# Patient Record
Sex: FEMALE | Race: WHITE | NOT HISPANIC OR LATINO
[De-identification: names, ages, dates, MRNs, and addresses within clinical notes are randomized per-mention and may not be internally consistent; named-entity substitution may affect disease eponyms.]

---

## 2017-04-14 ENCOUNTER — APPOINTMENT (OUTPATIENT)
Dept: OTOLARYNGOLOGY | Facility: CLINIC | Age: 57
End: 2017-04-14

## 2017-04-14 VITALS
BODY MASS INDEX: 28.7 KG/M2 | SYSTOLIC BLOOD PRESSURE: 121 MMHG | DIASTOLIC BLOOD PRESSURE: 74 MMHG | WEIGHT: 162 LBS | HEART RATE: 82 BPM | HEIGHT: 63 IN

## 2017-04-14 DIAGNOSIS — L29.9 PRURITUS, UNSPECIFIED: ICD-10-CM

## 2017-04-14 DIAGNOSIS — M26.609 UNSPECIFIED TEMPOROMANDIBULAR JOINT DISORDER: ICD-10-CM

## 2017-04-14 DIAGNOSIS — H69.82 OTHER SPECIFIED DISORDERS OF EUSTACHIAN TUBE, LEFT EAR: ICD-10-CM

## 2017-04-14 DIAGNOSIS — Z83.3 FAMILY HISTORY OF DIABETES MELLITUS: ICD-10-CM

## 2018-06-28 ENCOUNTER — APPOINTMENT (OUTPATIENT)
Dept: OTOLARYNGOLOGY | Facility: CLINIC | Age: 58
End: 2018-06-28
Payer: COMMERCIAL

## 2018-06-28 VITALS — SYSTOLIC BLOOD PRESSURE: 137 MMHG | HEART RATE: 82 BPM | OXYGEN SATURATION: 100 % | DIASTOLIC BLOOD PRESSURE: 83 MMHG

## 2018-06-28 VITALS — BODY MASS INDEX: 30.83 KG/M2 | WEIGHT: 174 LBS | HEIGHT: 63 IN

## 2018-06-28 PROCEDURE — 99203 OFFICE O/P NEW LOW 30 MIN: CPT

## 2018-06-28 PROCEDURE — 92550 TYMPANOMETRY & REFLEX THRESH: CPT

## 2018-06-28 PROCEDURE — 92557 COMPREHENSIVE HEARING TEST: CPT

## 2018-07-05 ENCOUNTER — APPOINTMENT (OUTPATIENT)
Dept: NEUROLOGY | Facility: CLINIC | Age: 58
End: 2018-07-05
Payer: COMMERCIAL

## 2018-07-05 VITALS
HEIGHT: 63 IN | BODY MASS INDEX: 30.83 KG/M2 | WEIGHT: 174 LBS | HEART RATE: 89 BPM | DIASTOLIC BLOOD PRESSURE: 85 MMHG | SYSTOLIC BLOOD PRESSURE: 139 MMHG

## 2018-07-05 DIAGNOSIS — R42 DIZZINESS AND GIDDINESS: ICD-10-CM

## 2018-07-05 DIAGNOSIS — D48.9 NEOPLASM OF UNCERTAIN BEHAVIOR, UNSPECIFIED: ICD-10-CM

## 2018-07-05 DIAGNOSIS — E66.9 OBESITY, UNSPECIFIED: ICD-10-CM

## 2018-07-05 PROCEDURE — 99204 OFFICE O/P NEW MOD 45 MIN: CPT

## 2018-07-05 RX ORDER — AMOXICILLIN 500 MG/1
500 CAPSULE ORAL
Qty: 20 | Refills: 0 | Status: DISCONTINUED | COMMUNITY
Start: 2017-04-07 | End: 2018-07-05

## 2018-07-05 RX ORDER — PREDNISOLONE ACETATE 10 MG/ML
1 SUSPENSION/ DROPS OPHTHALMIC
Qty: 1 | Refills: 3 | Status: DISCONTINUED | COMMUNITY
Start: 2017-04-14 | End: 2018-07-05

## 2018-07-05 RX ORDER — FLUTICASONE PROPIONATE 50 UG/1
50 SPRAY, METERED NASAL DAILY
Qty: 1 | Refills: 1 | Status: DISCONTINUED | COMMUNITY
Start: 2017-04-14 | End: 2018-07-05

## 2018-07-05 RX ORDER — CEFDINIR 300 MG/1
300 CAPSULE ORAL
Qty: 20 | Refills: 0 | Status: DISCONTINUED | COMMUNITY
Start: 2016-11-05 | End: 2018-07-05

## 2018-07-05 RX ORDER — CIPROFLOXACIN 3 MG/ML
0.3 SOLUTION OPHTHALMIC
Qty: 5 | Refills: 0 | Status: DISCONTINUED | COMMUNITY
Start: 2017-03-27 | End: 2018-07-05

## 2018-07-05 RX ORDER — AZITHROMYCIN 250 MG/1
250 TABLET, FILM COATED ORAL
Qty: 6 | Refills: 0 | Status: DISCONTINUED | COMMUNITY
Start: 2016-11-01 | End: 2018-07-05

## 2018-07-05 RX ORDER — AMOXICILLIN AND CLAVULANATE POTASSIUM 875; 125 MG/1; MG/1
875-125 TABLET, COATED ORAL
Qty: 20 | Refills: 0 | Status: DISCONTINUED | COMMUNITY
Start: 2016-11-04 | End: 2018-07-05

## 2018-07-19 ENCOUNTER — APPOINTMENT (OUTPATIENT)
Dept: NEUROLOGY | Facility: CLINIC | Age: 58
End: 2018-07-19

## 2020-11-07 ENCOUNTER — TRANSCRIPTION ENCOUNTER (OUTPATIENT)
Age: 60
End: 2020-11-07

## 2020-12-09 ENCOUNTER — TRANSCRIPTION ENCOUNTER (OUTPATIENT)
Age: 60
End: 2020-12-09

## 2021-03-13 ENCOUNTER — TRANSCRIPTION ENCOUNTER (OUTPATIENT)
Age: 61
End: 2021-03-13

## 2021-04-12 ENCOUNTER — TRANSCRIPTION ENCOUNTER (OUTPATIENT)
Age: 61
End: 2021-04-12

## 2021-05-03 ENCOUNTER — TRANSCRIPTION ENCOUNTER (OUTPATIENT)
Age: 61
End: 2021-05-03

## 2021-05-16 ENCOUNTER — TRANSCRIPTION ENCOUNTER (OUTPATIENT)
Age: 61
End: 2021-05-16

## 2021-05-27 ENCOUNTER — TRANSCRIPTION ENCOUNTER (OUTPATIENT)
Age: 61
End: 2021-05-27

## 2021-05-31 ENCOUNTER — TRANSCRIPTION ENCOUNTER (OUTPATIENT)
Age: 61
End: 2021-05-31

## 2021-08-22 ENCOUNTER — TRANSCRIPTION ENCOUNTER (OUTPATIENT)
Age: 61
End: 2021-08-22

## 2021-08-29 ENCOUNTER — TRANSCRIPTION ENCOUNTER (OUTPATIENT)
Age: 61
End: 2021-08-29

## 2021-09-09 ENCOUNTER — TRANSCRIPTION ENCOUNTER (OUTPATIENT)
Age: 61
End: 2021-09-09

## 2022-01-20 ENCOUNTER — RESULT REVIEW (OUTPATIENT)
Age: 62
End: 2022-01-20

## 2022-03-24 ENCOUNTER — NON-APPOINTMENT (OUTPATIENT)
Age: 62
End: 2022-03-24

## 2022-03-25 ENCOUNTER — APPOINTMENT (OUTPATIENT)
Dept: BREAST CENTER | Facility: CLINIC | Age: 62
End: 2022-03-25
Payer: COMMERCIAL

## 2022-03-25 VITALS
DIASTOLIC BLOOD PRESSURE: 86 MMHG | HEIGHT: 63 IN | SYSTOLIC BLOOD PRESSURE: 133 MMHG | BODY MASS INDEX: 32.07 KG/M2 | HEART RATE: 93 BPM | WEIGHT: 181 LBS

## 2022-03-25 DIAGNOSIS — Z80.9 FAMILY HISTORY OF MALIGNANT NEOPLASM, UNSPECIFIED: ICD-10-CM

## 2022-03-25 DIAGNOSIS — Z92.3 PERSONAL HISTORY OF IRRADIATION: ICD-10-CM

## 2022-03-25 DIAGNOSIS — N64.89 OTHER SPECIFIED DISORDERS OF BREAST: ICD-10-CM

## 2022-03-25 DIAGNOSIS — N64.4 MASTODYNIA: ICD-10-CM

## 2022-03-25 PROCEDURE — 99244 OFF/OP CNSLTJ NEW/EST MOD 40: CPT

## 2022-03-25 NOTE — DATA REVIEWED
[FreeTextEntry1] : Bilateral mammogram 1/21/2022:  No mammographic or ultrasound evidence of malignancy.\par \par (Images not brought.)

## 2022-03-25 NOTE — HISTORY OF PRESENT ILLNESS
[FreeTextEntry1] : This is a 61 year old female who has a history of left breast cancer at age 50.  She was treated with a lumpectomy and sentinel node biopsy and required a re-excision for margins at [place].  She received radiation and took tamoxifen for 6 months.  She stopped due to side effects of leg pains and " not feeling well."\par \par She has always had discomfort in the left breast, but now also complains of intermittent right breast pain for a few months.  It comes and goes.  She drinks 1 cup of coffee/day.  She has gained about 15 pounds recently.\par \par She is also bothered by the left breast deformity.

## 2022-03-25 NOTE — PHYSICAL EXAM
[EOMI] : extra ocular movement intact [Sclera nonicteric] : sclera nonicteric [Supple] : supple [No Supraclavicular Adenopathy] : no supraclavicular adenopathy [No Cervical Adenopathy] : no cervical adenopathy [No Thyromegaly] : no thyromegaly [Clear to Auscultation Bilat] : clear to auscultation bilaterally [Normal Sinus Rhythm] : normal sinus rhythm [Normal S1, S2] : normal S1 and S2 [Examined in the supine and seated position] : examined in the supine and seated position [Asymmetrical] : asymmetrical [Bra Size: ___] : Bra Size: [unfilled] [No dominant masses] : no dominant masses in right breast  [No dominant masses] : no dominant masses left breast [No Nipple Retraction] : no left nipple retraction [No Nipple Discharge] : no left nipple discharge [No Axillary Lymphadenopathy] : no left axillary lymphadenopathy [Soft] : abdomen soft [Not Tender] : non-tender [No Palpable Masses] : no abdominal mass palpated [de-identified] : Left breast smaller than right [de-identified] : Radial scar 2-3:00 with depression of tissue/deformity. [de-identified] : small scar

## 2022-03-25 NOTE — PAST MEDICAL HISTORY
[Total Preg ___] : G[unfilled] [Live Births ___] : P[unfilled]  [Age At Live Birth ___] : Age at live birth: [unfilled] [FreeTextEntry2] : 2 daughters

## 2022-04-07 DIAGNOSIS — Z13.79 ENCOUNTER FOR OTHER SCREENING FOR GENETIC AND CHROMOSOMAL ANOMALIES: ICD-10-CM

## 2022-04-12 ENCOUNTER — NON-APPOINTMENT (OUTPATIENT)
Age: 62
End: 2022-04-12

## 2022-04-20 ENCOUNTER — APPOINTMENT (OUTPATIENT)
Dept: MRI IMAGING | Facility: CLINIC | Age: 62
End: 2022-04-20
Payer: COMMERCIAL

## 2022-04-20 ENCOUNTER — RESULT REVIEW (OUTPATIENT)
Age: 62
End: 2022-04-20

## 2022-04-20 ENCOUNTER — TRANSCRIPTION ENCOUNTER (OUTPATIENT)
Age: 62
End: 2022-04-20

## 2022-04-20 PROCEDURE — A9585: CPT | Mod: JW

## 2022-04-20 PROCEDURE — 77049 MRI BREAST C-+ W/CAD BI: CPT

## 2022-04-21 DIAGNOSIS — R92.8 OTHER ABNORMAL AND INCONCLUSIVE FINDINGS ON DIAGNOSTIC IMAGING OF BREAST: ICD-10-CM

## 2022-04-25 ENCOUNTER — RESULT REVIEW (OUTPATIENT)
Age: 62
End: 2022-04-25

## 2022-04-25 ENCOUNTER — APPOINTMENT (OUTPATIENT)
Dept: ULTRASOUND IMAGING | Facility: IMAGING CENTER | Age: 62
End: 2022-04-25
Payer: COMMERCIAL

## 2022-04-25 ENCOUNTER — OUTPATIENT (OUTPATIENT)
Dept: OUTPATIENT SERVICES | Facility: HOSPITAL | Age: 62
LOS: 1 days | End: 2022-04-25
Payer: COMMERCIAL

## 2022-04-25 DIAGNOSIS — R92.8 OTHER ABNORMAL AND INCONCLUSIVE FINDINGS ON DIAGNOSTIC IMAGING OF BREAST: ICD-10-CM

## 2022-04-25 DIAGNOSIS — Z98.89 OTHER SPECIFIED POSTPROCEDURAL STATES: Chronic | ICD-10-CM

## 2022-04-25 PROCEDURE — 77065 DX MAMMO INCL CAD UNI: CPT

## 2022-04-25 PROCEDURE — 88305 TISSUE EXAM BY PATHOLOGIST: CPT

## 2022-04-25 PROCEDURE — 88305 TISSUE EXAM BY PATHOLOGIST: CPT | Mod: 26

## 2022-04-25 PROCEDURE — 77065 DX MAMMO INCL CAD UNI: CPT | Mod: 26,LT

## 2022-04-25 PROCEDURE — 19084 BX BREAST ADD LESION US IMAG: CPT | Mod: LT

## 2022-04-25 PROCEDURE — 19083 BX BREAST 1ST LESION US IMAG: CPT

## 2022-04-25 PROCEDURE — 88360 TUMOR IMMUNOHISTOCHEM/MANUAL: CPT

## 2022-04-25 PROCEDURE — 88377 M/PHMTRC ALYS ISHQUANT/SEMIQ: CPT | Mod: 26

## 2022-04-25 PROCEDURE — A4648: CPT

## 2022-04-25 PROCEDURE — 19083 BX BREAST 1ST LESION US IMAG: CPT | Mod: LT

## 2022-04-25 PROCEDURE — 19084 BX BREAST ADD LESION US IMAG: CPT

## 2022-04-25 PROCEDURE — 88360 TUMOR IMMUNOHISTOCHEM/MANUAL: CPT | Mod: 26

## 2022-04-25 PROCEDURE — 88377 M/PHMTRC ALYS ISHQUANT/SEMIQ: CPT

## 2022-05-02 ENCOUNTER — APPOINTMENT (OUTPATIENT)
Dept: BREAST CENTER | Facility: CLINIC | Age: 62
End: 2022-05-02
Payer: COMMERCIAL

## 2022-05-02 VITALS
BODY MASS INDEX: 32.07 KG/M2 | SYSTOLIC BLOOD PRESSURE: 121 MMHG | WEIGHT: 181 LBS | HEIGHT: 63 IN | DIASTOLIC BLOOD PRESSURE: 84 MMHG | HEART RATE: 82 BPM

## 2022-05-02 DIAGNOSIS — Z85.3 PERSONAL HISTORY OF MALIGNANT NEOPLASM OF BREAST: ICD-10-CM

## 2022-05-02 PROCEDURE — 99215 OFFICE O/P EST HI 40 MIN: CPT

## 2022-05-02 RX ORDER — LEVOCETIRIZINE DIHYDROCHLORIDE 0.5 MG/ML
2.5 SOLUTION ORAL
Refills: 0 | Status: DISCONTINUED | COMMUNITY
End: 2022-05-02

## 2022-05-02 NOTE — DATA REVIEWED
[FreeTextEntry1] : Bilateral mammogram 1/21/2022:  No mammographic or ultrasound evidence of malignancy.\par \par Bilateral breast MRI 4/20/2022 Postlumpectomy changes left anterior outer breast.  Two adjacent enhancing masses 5 and 6 mm, 6 mm apart, rec Ultrasound and core biopsies.\par \par Pathology 4/25/2022:  Left breast 3N3= invasive well diff ductal carcinoma, SBR 5/9 4 mm (heart clip)\par                                     Left 3 N4= invasive well diff ductal carcinoma, SBR 5/9, DCIS, ER >95% WA 0, Her 2 2+ CISH nonamplified. (wing clip)

## 2022-05-02 NOTE — PHYSICAL EXAM
[EOMI] : extra ocular movement intact [Sclera nonicteric] : sclera nonicteric [Supple] : supple [No Supraclavicular Adenopathy] : no supraclavicular adenopathy [No Cervical Adenopathy] : no cervical adenopathy [No Thyromegaly] : no thyromegaly [Clear to Auscultation Bilat] : clear to auscultation bilaterally [Normal Sinus Rhythm] : normal sinus rhythm [Normal S1, S2] : normal S1 and S2 [Examined in the supine and seated position] : examined in the supine and seated position [Asymmetrical] : asymmetrical [Bra Size: ___] : Bra Size: [unfilled] [No dominant masses] : no dominant masses in right breast  [No dominant masses] : no dominant masses left breast [No Nipple Retraction] : no left nipple retraction [No Nipple Discharge] : no left nipple discharge [No Axillary Lymphadenopathy] : no left axillary lymphadenopathy [Soft] : abdomen soft [Not Tender] : non-tender [No Palpable Masses] : no abdominal mass palpated [de-identified] : Left breast smaller than right [de-identified] : Radial scar 2-3:00 with depression of tissue/deformity. [de-identified] : small scar

## 2022-05-02 NOTE — HISTORY OF PRESENT ILLNESS
[FreeTextEntry1] : This is a 61 year old female who has a history of left breast cancer at age 50.  She was treated with a lumpectomy and sentinel node biopsy and required a re-excision for margins at [place].  She had a 1.4 cm tubular cancer with extensive DCIS, 6 negative nodes. She received radiation and took tamoxifen for 6 months.  She stopped due to side effects of leg pains and " not feeling well."\par \par She was seen for an initial visit on March 25. She had always had discomfort in the left breast, but also complained of intermittent right breast pain for a few months.  She was also bothered by the left breast deformity.\par \par I sent her for a breast MRI and there were two small abnormalities in the left breast.  Ultrasound guided core biopsies of both sites showed invasive ductal cancer.

## 2022-05-03 ENCOUNTER — NON-APPOINTMENT (OUTPATIENT)
Age: 62
End: 2022-05-03

## 2022-05-06 ENCOUNTER — APPOINTMENT (OUTPATIENT)
Dept: NUCLEAR MEDICINE | Facility: IMAGING CENTER | Age: 62
End: 2022-05-06
Payer: COMMERCIAL

## 2022-05-06 ENCOUNTER — OUTPATIENT (OUTPATIENT)
Dept: OUTPATIENT SERVICES | Facility: HOSPITAL | Age: 62
LOS: 1 days | End: 2022-05-06
Payer: COMMERCIAL

## 2022-05-06 DIAGNOSIS — Z98.89 OTHER SPECIFIED POSTPROCEDURAL STATES: Chronic | ICD-10-CM

## 2022-05-06 DIAGNOSIS — Z00.8 ENCOUNTER FOR OTHER GENERAL EXAMINATION: ICD-10-CM

## 2022-05-06 DIAGNOSIS — C50.912 MALIGNANT NEOPLASM OF UNSPECIFIED SITE OF LEFT FEMALE BREAST: ICD-10-CM

## 2022-05-06 PROCEDURE — 78815 PET IMAGE W/CT SKULL-THIGH: CPT

## 2022-05-06 PROCEDURE — A9552: CPT

## 2022-05-06 PROCEDURE — 78815 PET IMAGE W/CT SKULL-THIGH: CPT | Mod: 26,PI

## 2022-05-09 ENCOUNTER — APPOINTMENT (OUTPATIENT)
Age: 62
End: 2022-05-09
Payer: COMMERCIAL

## 2022-05-09 VITALS
HEIGHT: 63 IN | HEART RATE: 81 BPM | DIASTOLIC BLOOD PRESSURE: 78 MMHG | OXYGEN SATURATION: 98 % | BODY MASS INDEX: 30.03 KG/M2 | SYSTOLIC BLOOD PRESSURE: 122 MMHG | TEMPERATURE: 97.5 F | WEIGHT: 169.5 LBS

## 2022-05-09 DIAGNOSIS — Z42.1 ENCOUNTER FOR BREAST RECONSTRUCTION FOLLOWING MASTECTOMY: ICD-10-CM

## 2022-05-09 PROCEDURE — 99204 OFFICE O/P NEW MOD 45 MIN: CPT

## 2022-05-10 PROBLEM — Z42.1 ENCOUNTER FOR BREAST RECONSTRUCTION FOLLOWING MASTECTOMY: Status: ACTIVE | Noted: 2022-05-10

## 2022-05-11 ENCOUNTER — NON-APPOINTMENT (OUTPATIENT)
Age: 62
End: 2022-05-11

## 2022-05-11 NOTE — PHYSICAL EXAM
[NI] : Normal [de-identified] : NAD, AxOx3  [de-identified] : nonlabored breathing  [de-identified] : normal HR [de-identified] : Bilateral Breasts- no masses, no nipple discharge or retraction. There is asymmetry with right >left. Left lumpectomy scar well healed. Mild radiation changes to left breast. There is grade 3 ptosis.\par \par RIGHT:\par Sn-N 29\par N-IMF 11\par BW 14\par \par LEFT;\par SN-N 27\par N-IMF 9\par BW 13  [de-identified] : soft, no appreciable hernias, mild pannus  [de-identified] : no edema  [de-identified] : normal affect

## 2022-05-11 NOTE — HISTORY OF PRESENT ILLNESS
[FreeTextEntry1] : Florencia Berg is a 60 y/o female with history of left breast cancer at age 50.  She had a lumpectomy at the time and received radiation and tamoxifen, which patient stopped due to side effects. \par She now presents with new invasive ductal cancer on the left. She presents today with her  to discuss post mastectomy reconstruction options. \par She otherwise has no complaints today. 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

## 2022-05-20 ENCOUNTER — APPOINTMENT (OUTPATIENT)
Dept: MRI IMAGING | Facility: CLINIC | Age: 62
End: 2022-05-20
Payer: COMMERCIAL

## 2022-05-20 ENCOUNTER — OUTPATIENT (OUTPATIENT)
Dept: OUTPATIENT SERVICES | Facility: HOSPITAL | Age: 62
LOS: 1 days | End: 2022-05-20
Payer: COMMERCIAL

## 2022-05-20 DIAGNOSIS — C50.412 MALIGNANT NEOPLASM OF UPPER-OUTER QUADRANT OF LEFT FEMALE BREAST: ICD-10-CM

## 2022-05-20 DIAGNOSIS — Z42.1 ENCOUNTER FOR BREAST RECONSTRUCTION FOLLOWING MASTECTOMY: ICD-10-CM

## 2022-05-20 DIAGNOSIS — Z98.89 OTHER SPECIFIED POSTPROCEDURAL STATES: Chronic | ICD-10-CM

## 2022-05-20 PROCEDURE — A9585: CPT

## 2022-05-20 PROCEDURE — C8902: CPT

## 2022-05-20 PROCEDURE — 72198 MR ANGIO PELVIS W/O & W/DYE: CPT | Mod: 26

## 2022-05-20 PROCEDURE — 74185 MRA ABD W OR W/O CNTRST: CPT | Mod: 26

## 2022-05-20 PROCEDURE — C8920: CPT

## 2022-05-24 ENCOUNTER — NON-APPOINTMENT (OUTPATIENT)
Age: 62
End: 2022-05-24

## 2022-05-25 ENCOUNTER — RESULT REVIEW (OUTPATIENT)
Age: 62
End: 2022-05-25

## 2022-05-25 ENCOUNTER — OUTPATIENT (OUTPATIENT)
Dept: OUTPATIENT SERVICES | Facility: HOSPITAL | Age: 62
LOS: 1 days | End: 2022-05-25
Payer: COMMERCIAL

## 2022-05-25 DIAGNOSIS — C50.412 MALIGNANT NEOPLASM OF UPPER-OUTER QUADRANT OF LEFT FEMALE BREAST: ICD-10-CM

## 2022-05-25 DIAGNOSIS — Z98.89 OTHER SPECIFIED POSTPROCEDURAL STATES: Chronic | ICD-10-CM

## 2022-05-25 PROCEDURE — 88321 CONSLTJ&REPRT SLD PREP ELSWR: CPT

## 2022-05-26 DIAGNOSIS — C50.412 MALIGNANT NEOPLASM OF UPPER-OUTER QUADRANT OF LEFT FEMALE BREAST: ICD-10-CM

## 2022-06-14 ENCOUNTER — RESULT REVIEW (OUTPATIENT)
Age: 62
End: 2022-06-14

## 2022-06-14 ENCOUNTER — OUTPATIENT (OUTPATIENT)
Dept: OUTPATIENT SERVICES | Facility: HOSPITAL | Age: 62
LOS: 1 days | End: 2022-06-14
Payer: COMMERCIAL

## 2022-06-14 DIAGNOSIS — Z01.818 ENCOUNTER FOR OTHER PREPROCEDURAL EXAMINATION: ICD-10-CM

## 2022-06-14 DIAGNOSIS — Z98.51 TUBAL LIGATION STATUS: Chronic | ICD-10-CM

## 2022-06-14 DIAGNOSIS — Z85.3 PERSONAL HISTORY OF MALIGNANT NEOPLASM OF BREAST: ICD-10-CM

## 2022-06-14 DIAGNOSIS — Z98.89 OTHER SPECIFIED POSTPROCEDURAL STATES: Chronic | ICD-10-CM

## 2022-06-14 LAB
ALBUMIN SERPL ELPH-MCNC: 4.4 G/DL — SIGNIFICANT CHANGE UP (ref 3.3–5)
ALP SERPL-CCNC: 119 U/L — SIGNIFICANT CHANGE UP (ref 40–120)
ALT FLD-CCNC: 23 U/L — SIGNIFICANT CHANGE UP (ref 12–78)
ANION GAP SERPL CALC-SCNC: 9 MMOL/L — SIGNIFICANT CHANGE UP (ref 5–17)
APPEARANCE UR: CLEAR — SIGNIFICANT CHANGE UP
APTT BLD: 34.3 SEC — SIGNIFICANT CHANGE UP (ref 27.5–35.5)
AST SERPL-CCNC: 9 U/L — LOW (ref 15–37)
BASOPHILS # BLD AUTO: 0.05 K/UL — SIGNIFICANT CHANGE UP (ref 0–0.2)
BASOPHILS NFR BLD AUTO: 0.6 % — SIGNIFICANT CHANGE UP (ref 0–2)
BILIRUB SERPL-MCNC: 0.4 MG/DL — SIGNIFICANT CHANGE UP (ref 0.2–1.2)
BILIRUB UR-MCNC: NEGATIVE — SIGNIFICANT CHANGE UP
BUN SERPL-MCNC: 14 MG/DL — SIGNIFICANT CHANGE UP (ref 7–23)
CALCIUM SERPL-MCNC: 10.2 MG/DL — HIGH (ref 8.5–10.1)
CHLORIDE SERPL-SCNC: 104 MMOL/L — SIGNIFICANT CHANGE UP (ref 96–108)
CO2 SERPL-SCNC: 25 MMOL/L — SIGNIFICANT CHANGE UP (ref 22–31)
COLOR SPEC: YELLOW — SIGNIFICANT CHANGE UP
CREAT SERPL-MCNC: 0.61 MG/DL — SIGNIFICANT CHANGE UP (ref 0.5–1.3)
DIFF PNL FLD: NEGATIVE — SIGNIFICANT CHANGE UP
EGFR: 102 ML/MIN/1.73M2 — SIGNIFICANT CHANGE UP
EOSINOPHIL # BLD AUTO: 0.04 K/UL — SIGNIFICANT CHANGE UP (ref 0–0.5)
EOSINOPHIL NFR BLD AUTO: 0.5 % — SIGNIFICANT CHANGE UP (ref 0–6)
GLUCOSE SERPL-MCNC: 80 MG/DL — SIGNIFICANT CHANGE UP (ref 70–99)
GLUCOSE UR QL: NEGATIVE — SIGNIFICANT CHANGE UP
HCT VFR BLD CALC: 43 % — SIGNIFICANT CHANGE UP (ref 34.5–45)
HGB BLD-MCNC: 13.8 G/DL — SIGNIFICANT CHANGE UP (ref 11.5–15.5)
IMM GRANULOCYTES NFR BLD AUTO: 0.4 % — SIGNIFICANT CHANGE UP (ref 0–1.5)
INR BLD: 1.09 RATIO — SIGNIFICANT CHANGE UP (ref 0.88–1.16)
KETONES UR-MCNC: ABNORMAL
LEUKOCYTE ESTERASE UR-ACNC: NEGATIVE — SIGNIFICANT CHANGE UP
LYMPHOCYTES # BLD AUTO: 2.5 K/UL — SIGNIFICANT CHANGE UP (ref 1–3.3)
LYMPHOCYTES # BLD AUTO: 29.5 % — SIGNIFICANT CHANGE UP (ref 13–44)
MCHC RBC-ENTMCNC: 28.2 PG — SIGNIFICANT CHANGE UP (ref 27–34)
MCHC RBC-ENTMCNC: 32.1 GM/DL — SIGNIFICANT CHANGE UP (ref 32–36)
MCV RBC AUTO: 87.9 FL — SIGNIFICANT CHANGE UP (ref 80–100)
MONOCYTES # BLD AUTO: 0.46 K/UL — SIGNIFICANT CHANGE UP (ref 0–0.9)
MONOCYTES NFR BLD AUTO: 5.4 % — SIGNIFICANT CHANGE UP (ref 2–14)
NEUTROPHILS # BLD AUTO: 5.39 K/UL — SIGNIFICANT CHANGE UP (ref 1.8–7.4)
NEUTROPHILS NFR BLD AUTO: 63.6 % — SIGNIFICANT CHANGE UP (ref 43–77)
NITRITE UR-MCNC: NEGATIVE — SIGNIFICANT CHANGE UP
PH UR: 6 — SIGNIFICANT CHANGE UP (ref 5–8)
PLATELET # BLD AUTO: 343 K/UL — SIGNIFICANT CHANGE UP (ref 150–400)
POTASSIUM SERPL-MCNC: 3.6 MMOL/L — SIGNIFICANT CHANGE UP (ref 3.5–5.3)
POTASSIUM SERPL-SCNC: 3.6 MMOL/L — SIGNIFICANT CHANGE UP (ref 3.5–5.3)
PROT SERPL-MCNC: 8.2 GM/DL — SIGNIFICANT CHANGE UP (ref 6–8.3)
PROT UR-MCNC: NEGATIVE — SIGNIFICANT CHANGE UP
PROTHROM AB SERPL-ACNC: 12.6 SEC — SIGNIFICANT CHANGE UP (ref 10.5–13.4)
RBC # BLD: 4.89 M/UL — SIGNIFICANT CHANGE UP (ref 3.8–5.2)
RBC # FLD: 13.4 % — SIGNIFICANT CHANGE UP (ref 10.3–14.5)
SODIUM SERPL-SCNC: 138 MMOL/L — SIGNIFICANT CHANGE UP (ref 135–145)
SP GR SPEC: 1.02 — SIGNIFICANT CHANGE UP (ref 1.01–1.02)
UROBILINOGEN FLD QL: NEGATIVE — SIGNIFICANT CHANGE UP
WBC # BLD: 8.47 K/UL — SIGNIFICANT CHANGE UP (ref 3.8–10.5)
WBC # FLD AUTO: 8.47 K/UL — SIGNIFICANT CHANGE UP (ref 3.8–10.5)

## 2022-06-14 PROCEDURE — 71046 X-RAY EXAM CHEST 2 VIEWS: CPT | Mod: 26

## 2022-06-14 PROCEDURE — 86850 RBC ANTIBODY SCREEN: CPT

## 2022-06-14 PROCEDURE — 93010 ELECTROCARDIOGRAM REPORT: CPT

## 2022-06-14 PROCEDURE — 86900 BLOOD TYPING SEROLOGIC ABO: CPT

## 2022-06-14 PROCEDURE — 93005 ELECTROCARDIOGRAM TRACING: CPT

## 2022-06-14 PROCEDURE — 81003 URINALYSIS AUTO W/O SCOPE: CPT

## 2022-06-14 PROCEDURE — 86901 BLOOD TYPING SEROLOGIC RH(D): CPT

## 2022-06-14 PROCEDURE — 36415 COLL VENOUS BLD VENIPUNCTURE: CPT

## 2022-06-14 PROCEDURE — 85610 PROTHROMBIN TIME: CPT

## 2022-06-14 PROCEDURE — 85730 THROMBOPLASTIN TIME PARTIAL: CPT

## 2022-06-14 PROCEDURE — 80053 COMPREHEN METABOLIC PANEL: CPT

## 2022-06-14 PROCEDURE — 85025 COMPLETE CBC W/AUTO DIFF WBC: CPT

## 2022-06-14 PROCEDURE — 71046 X-RAY EXAM CHEST 2 VIEWS: CPT

## 2022-06-14 NOTE — PATIENT PROFILE ADULT - FALL HARM RISK - UNIVERSAL INTERVENTIONS
Bed in lowest position, wheels locked, appropriate side rails in place/Call bell, personal items and telephone in reach/Instruct patient to call for assistance before getting out of bed or chair/Non-slip footwear when patient is out of bed/[place] to call system/Physically safe environment - no spills, clutter or unnecessary equipment/Purposeful Proactive Rounding/Room/bathroom lighting operational, light cord in reach

## 2022-06-14 NOTE — CHART NOTE - NSCHARTNOTEFT_GEN_A_CORE
6/14/2022- 62 y.o female scheduled for Bilateral Mastectomy , Left Axillary [place] Node Biopsy, possible Axillary Dissection, Bilateral Magtrace. Bilateral ARNOLD Flap, Bilateral Nerve Graft, Bilateral Internal Mammary Lymph Node Biopsy  VS: BP-115/80 P-75  T-97  SpO2-100%  Plan  1. Stop all NSAIDS, herbal supplements and vitamins for 7 days.  2. NPO at midnight.  3. Take the following medications--none-- with small sips of water on the morning of your procedure/surgery.  4. Use EZ sponges as directed  5. Labs, EKG, CXR  as per surgeon  6. PMD IVETTE Boykin visit for optimization prior to surgery as per surgeon  7. COVID swab appt: 6/18/2022

## 2022-06-15 DIAGNOSIS — Z85.3 PERSONAL HISTORY OF MALIGNANT NEOPLASM OF BREAST: ICD-10-CM

## 2022-06-15 DIAGNOSIS — Z01.818 ENCOUNTER FOR OTHER PREPROCEDURAL EXAMINATION: ICD-10-CM

## 2022-06-22 ENCOUNTER — INPATIENT (INPATIENT)
Facility: HOSPITAL | Age: 62
LOS: 1 days | Discharge: HOME | DRG: 581 | End: 2022-06-24
Attending: SURGERY | Admitting: SURGERY
Payer: COMMERCIAL

## 2022-06-22 ENCOUNTER — APPOINTMENT (OUTPATIENT)
Dept: PLASTIC SURGERY | Facility: HOSPITAL | Age: 62
End: 2022-06-22

## 2022-06-22 ENCOUNTER — APPOINTMENT (OUTPATIENT)
Dept: BREAST CENTER | Facility: HOSPITAL | Age: 62
End: 2022-06-22

## 2022-06-22 ENCOUNTER — RESULT REVIEW (OUTPATIENT)
Age: 62
End: 2022-06-22

## 2022-06-22 VITALS
HEIGHT: 63 IN | HEART RATE: 71 BPM | SYSTOLIC BLOOD PRESSURE: 130 MMHG | DIASTOLIC BLOOD PRESSURE: 73 MMHG | WEIGHT: 162.92 LBS | OXYGEN SATURATION: 100 % | RESPIRATION RATE: 18 BRPM | TEMPERATURE: 98 F

## 2022-06-22 DIAGNOSIS — Z98.51 TUBAL LIGATION STATUS: Chronic | ICD-10-CM

## 2022-06-22 DIAGNOSIS — Z85.3 PERSONAL HISTORY OF MALIGNANT NEOPLASM OF BREAST: ICD-10-CM

## 2022-06-22 DIAGNOSIS — Z98.89 OTHER SPECIFIED POSTPROCEDURAL STATES: Chronic | ICD-10-CM

## 2022-06-22 LAB
ANION GAP SERPL CALC-SCNC: 11 MMOL/L — SIGNIFICANT CHANGE UP (ref 5–17)
BUN SERPL-MCNC: 8 MG/DL — SIGNIFICANT CHANGE UP (ref 7–23)
CALCIUM SERPL-MCNC: 8 MG/DL — LOW (ref 8.5–10.1)
CHLORIDE SERPL-SCNC: 108 MMOL/L — SIGNIFICANT CHANGE UP (ref 96–108)
CO2 SERPL-SCNC: 22 MMOL/L — SIGNIFICANT CHANGE UP (ref 22–31)
CREAT SERPL-MCNC: 0.67 MG/DL — SIGNIFICANT CHANGE UP (ref 0.5–1.3)
EGFR: 100 ML/MIN/1.73M2 — SIGNIFICANT CHANGE UP
GLUCOSE SERPL-MCNC: 150 MG/DL — HIGH (ref 70–99)
HCT VFR BLD CALC: 33.4 % — LOW (ref 34.5–45)
HGB BLD-MCNC: 11.1 G/DL — LOW (ref 11.5–15.5)
MCHC RBC-ENTMCNC: 29.4 PG — SIGNIFICANT CHANGE UP (ref 27–34)
MCHC RBC-ENTMCNC: 33.2 GM/DL — SIGNIFICANT CHANGE UP (ref 32–36)
MCV RBC AUTO: 88.4 FL — SIGNIFICANT CHANGE UP (ref 80–100)
PLATELET # BLD AUTO: 284 K/UL — SIGNIFICANT CHANGE UP (ref 150–400)
POTASSIUM SERPL-MCNC: 4 MMOL/L — SIGNIFICANT CHANGE UP (ref 3.5–5.3)
POTASSIUM SERPL-SCNC: 4 MMOL/L — SIGNIFICANT CHANGE UP (ref 3.5–5.3)
RBC # BLD: 3.78 M/UL — LOW (ref 3.8–5.2)
RBC # FLD: 13.7 % — SIGNIFICANT CHANGE UP (ref 10.3–14.5)
SODIUM SERPL-SCNC: 141 MMOL/L — SIGNIFICANT CHANGE UP (ref 135–145)
WBC # BLD: 14.7 K/UL — HIGH (ref 3.8–10.5)
WBC # FLD AUTO: 14.7 K/UL — HIGH (ref 3.8–10.5)

## 2022-06-22 PROCEDURE — C1889: CPT

## 2022-06-22 PROCEDURE — 19303 MAST SIMPLE COMPLETE: CPT | Mod: 50

## 2022-06-22 PROCEDURE — 88333 PATH CONSLTJ SURG CYTO XM 1: CPT | Mod: 26

## 2022-06-22 PROCEDURE — 38792 RA TRACER ID OF SENTINL NODE: CPT | Mod: LT,59

## 2022-06-22 PROCEDURE — 88307 TISSUE EXAM BY PATHOLOGIST: CPT | Mod: 26

## 2022-06-22 PROCEDURE — 36415 COLL VENOUS BLD VENIPUNCTURE: CPT

## 2022-06-22 PROCEDURE — 38790 INJECT FOR LYMPHATIC X-RAY: CPT | Mod: 50,59

## 2022-06-22 PROCEDURE — A9520: CPT

## 2022-06-22 PROCEDURE — 88307 TISSUE EXAM BY PATHOLOGIST: CPT

## 2022-06-22 PROCEDURE — 80048 BASIC METABOLIC PNL TOTAL CA: CPT

## 2022-06-22 PROCEDURE — XXXXX: CPT

## 2022-06-22 PROCEDURE — 38525 BIOPSY/REMOVAL LYMPH NODES: CPT | Mod: 50

## 2022-06-22 PROCEDURE — 76098 X-RAY EXAM SURGICAL SPECIMEN: CPT

## 2022-06-22 PROCEDURE — 88305 TISSUE EXAM BY PATHOLOGIST: CPT | Mod: 26

## 2022-06-22 PROCEDURE — 85027 COMPLETE CBC AUTOMATED: CPT

## 2022-06-22 PROCEDURE — 88305 TISSUE EXAM BY PATHOLOGIST: CPT

## 2022-06-22 PROCEDURE — 88333 PATH CONSLTJ SURG CYTO XM 1: CPT

## 2022-06-22 PROCEDURE — 38900 IO MAP OF SENT LYMPH NODE: CPT | Mod: 50

## 2022-06-22 RX ORDER — OXYCODONE HYDROCHLORIDE 5 MG/1
5 TABLET ORAL EVERY 4 HOURS
Refills: 0 | Status: DISCONTINUED | OUTPATIENT
Start: 2022-06-22 | End: 2022-06-24

## 2022-06-22 RX ORDER — OXYCODONE HYDROCHLORIDE 5 MG/1
10 TABLET ORAL EVERY 4 HOURS
Refills: 0 | Status: DISCONTINUED | OUTPATIENT
Start: 2022-06-22 | End: 2022-06-24

## 2022-06-22 RX ORDER — ACETAMINOPHEN 500 MG
975 TABLET ORAL EVERY 8 HOURS
Refills: 0 | Status: DISCONTINUED | OUTPATIENT
Start: 2022-06-22 | End: 2022-06-24

## 2022-06-22 RX ORDER — HYDROMORPHONE HYDROCHLORIDE 2 MG/ML
0.5 INJECTION INTRAMUSCULAR; INTRAVENOUS; SUBCUTANEOUS
Refills: 0 | Status: DISCONTINUED | OUTPATIENT
Start: 2022-06-22 | End: 2022-06-24

## 2022-06-22 RX ORDER — ONDANSETRON 8 MG/1
4 TABLET, FILM COATED ORAL ONCE
Refills: 0 | Status: DISCONTINUED | OUTPATIENT
Start: 2022-06-22 | End: 2022-06-23

## 2022-06-22 RX ORDER — ACETAMINOPHEN 500 MG
1000 TABLET ORAL EVERY 8 HOURS
Refills: 0 | Status: COMPLETED | OUTPATIENT
Start: 2022-06-22 | End: 2022-06-23

## 2022-06-22 RX ORDER — SODIUM CHLORIDE 9 MG/ML
1000 INJECTION, SOLUTION INTRAVENOUS
Refills: 0 | Status: DISCONTINUED | OUTPATIENT
Start: 2022-06-22 | End: 2022-06-23

## 2022-06-22 RX ORDER — CEFAZOLIN SODIUM 1 G
2000 VIAL (EA) INJECTION EVERY 8 HOURS
Refills: 0 | Status: COMPLETED | OUTPATIENT
Start: 2022-06-22 | End: 2022-06-23

## 2022-06-22 RX ORDER — IBUPROFEN 200 MG
400 TABLET ORAL EVERY 6 HOURS
Refills: 0 | Status: COMPLETED | OUTPATIENT
Start: 2022-06-22 | End: 2023-05-21

## 2022-06-22 RX ORDER — KETOROLAC TROMETHAMINE 30 MG/ML
15 SYRINGE (ML) INJECTION EVERY 6 HOURS
Refills: 0 | Status: DISCONTINUED | OUTPATIENT
Start: 2022-06-22 | End: 2022-06-23

## 2022-06-22 RX ORDER — FENTANYL CITRATE 50 UG/ML
50 INJECTION INTRAVENOUS
Refills: 0 | Status: DISCONTINUED | OUTPATIENT
Start: 2022-06-22 | End: 2022-06-23

## 2022-06-22 RX ORDER — OXYCODONE HYDROCHLORIDE 5 MG/1
10 TABLET ORAL ONCE
Refills: 0 | Status: DISCONTINUED | OUTPATIENT
Start: 2022-06-22 | End: 2022-06-22

## 2022-06-22 RX ADMIN — HYDROMORPHONE HYDROCHLORIDE 0.5 MILLIGRAM(S): 2 INJECTION INTRAMUSCULAR; INTRAVENOUS; SUBCUTANEOUS at 18:45

## 2022-06-22 RX ADMIN — OXYCODONE HYDROCHLORIDE 10 MILLIGRAM(S): 5 TABLET ORAL at 17:35

## 2022-06-22 RX ADMIN — Medication 400 MILLIGRAM(S): at 22:13

## 2022-06-22 RX ADMIN — OXYCODONE HYDROCHLORIDE 10 MILLIGRAM(S): 5 TABLET ORAL at 18:13

## 2022-06-22 RX ADMIN — SODIUM CHLORIDE 75 MILLILITER(S): 9 INJECTION, SOLUTION INTRAVENOUS at 17:36

## 2022-06-22 RX ADMIN — Medication 1000 MILLIGRAM(S): at 22:30

## 2022-06-22 RX ADMIN — Medication 15 MILLIGRAM(S): at 23:36

## 2022-06-22 RX ADMIN — HYDROMORPHONE HYDROCHLORIDE 0.5 MILLIGRAM(S): 2 INJECTION INTRAMUSCULAR; INTRAVENOUS; SUBCUTANEOUS at 19:00

## 2022-06-22 RX ADMIN — FENTANYL CITRATE 50 MICROGRAM(S): 50 INJECTION INTRAVENOUS at 18:50

## 2022-06-22 RX ADMIN — SODIUM CHLORIDE 125 MILLILITER(S): 9 INJECTION, SOLUTION INTRAVENOUS at 22:12

## 2022-06-22 RX ADMIN — FENTANYL CITRATE 50 MICROGRAM(S): 50 INJECTION INTRAVENOUS at 17:35

## 2022-06-22 RX ADMIN — HYDROMORPHONE HYDROCHLORIDE 0.5 MILLIGRAM(S): 2 INJECTION INTRAMUSCULAR; INTRAVENOUS; SUBCUTANEOUS at 20:15

## 2022-06-22 RX ADMIN — Medication 15 MILLIGRAM(S): at 23:21

## 2022-06-22 RX ADMIN — HYDROMORPHONE HYDROCHLORIDE 0.5 MILLIGRAM(S): 2 INJECTION INTRAMUSCULAR; INTRAVENOUS; SUBCUTANEOUS at 20:30

## 2022-06-22 RX ADMIN — SODIUM CHLORIDE 125 MILLILITER(S): 9 INJECTION, SOLUTION INTRAVENOUS at 19:04

## 2022-06-22 NOTE — BRIEF OPERATIVE NOTE - NSICDXBRIEFPREOP_GEN_ALL_CORE_FT
PRE-OP DIAGNOSIS:  Infiltrating ductal carcinoma of left breast 22-Jun-2022 13:36:03  Romain Mccracken  
PRE-OP DIAGNOSIS:  Acquired absence of both breasts 22-Jun-2022 17:39:19  Zeb Arellano

## 2022-06-22 NOTE — BRIEF OPERATIVE NOTE - SPECIMENS
roxanne int mamm ln's
right breast, right sentinel lymph node x1; left breast , left sentinel lymph nodex1

## 2022-06-22 NOTE — BRIEF OPERATIVE NOTE - NSICDXBRIEFPROCEDURE_GEN_ALL_CORE_FT
PROCEDURES:  Mastectomy, bilateral, skin sparing 22-Jun-2022 13:34:45  Romain Mccracken  Biopsy of left axillary sentinel nodes 22-Jun-2022 13:35:00  Romain Mccracken  Biopsy of right axillary sentinel nodes 22-Jun-2022 13:35:22  Romain Mccracken  
PROCEDURES:  Breast reconstruction with ARNOLD free flap 22-Jun-2022 17:38:45 bilateral Zeb Arellano

## 2022-06-22 NOTE — PATIENT PROFILE ADULT - FALL HARM RISK - UNIVERSAL INTERVENTIONS
Bed in lowest position, wheels locked, appropriate side rails in place/Call bell, personal items and telephone in reach/Instruct patient to call for assistance before getting out of bed or chair/Non-slip footwear when patient is out of bed/[place] to call system/Physically safe environment - no spills, clutter or unnecessary equipment/Purposeful Proactive Rounding/Room/bathroom lighting operational, light cord in reach

## 2022-06-22 NOTE — BRIEF OPERATIVE NOTE - NSICDXBRIEFPOSTOP_GEN_ALL_CORE_FT
POST-OP DIAGNOSIS:  Acquired absence of both breasts 22-Jun-2022 17:39:31  Zeb Arellano  
POST-OP DIAGNOSIS:  Infiltrating ductal carcinoma of left breast 22-Jun-2022 13:36:21  Romain Mccracken

## 2022-06-22 NOTE — BRIEF OPERATIVE NOTE - OPERATION/FINDINGS
left breast 303 grams, right breast 530 grams  left sentinel lymph node #1 : negative for metastatic carcinoma on intraoperative pathologic review (touch prep)

## 2022-06-23 LAB
ANION GAP SERPL CALC-SCNC: 4 MMOL/L — LOW (ref 5–17)
BUN SERPL-MCNC: 8 MG/DL — SIGNIFICANT CHANGE UP (ref 7–23)
CALCIUM SERPL-MCNC: 8.7 MG/DL — SIGNIFICANT CHANGE UP (ref 8.5–10.1)
CHLORIDE SERPL-SCNC: 109 MMOL/L — HIGH (ref 96–108)
CO2 SERPL-SCNC: 28 MMOL/L — SIGNIFICANT CHANGE UP (ref 22–31)
CREAT SERPL-MCNC: 0.55 MG/DL — SIGNIFICANT CHANGE UP (ref 0.5–1.3)
EGFR: 105 ML/MIN/1.73M2 — SIGNIFICANT CHANGE UP
GLUCOSE SERPL-MCNC: 110 MG/DL — HIGH (ref 70–99)
HCT VFR BLD CALC: 30.6 % — LOW (ref 34.5–45)
HGB BLD-MCNC: 10.2 G/DL — LOW (ref 11.5–15.5)
MCHC RBC-ENTMCNC: 28.8 PG — SIGNIFICANT CHANGE UP (ref 27–34)
MCHC RBC-ENTMCNC: 33.3 GM/DL — SIGNIFICANT CHANGE UP (ref 32–36)
MCV RBC AUTO: 86.4 FL — SIGNIFICANT CHANGE UP (ref 80–100)
PLATELET # BLD AUTO: 257 K/UL — SIGNIFICANT CHANGE UP (ref 150–400)
POTASSIUM SERPL-MCNC: 3.9 MMOL/L — SIGNIFICANT CHANGE UP (ref 3.5–5.3)
POTASSIUM SERPL-SCNC: 3.9 MMOL/L — SIGNIFICANT CHANGE UP (ref 3.5–5.3)
RBC # BLD: 3.54 M/UL — LOW (ref 3.8–5.2)
RBC # FLD: 14 % — SIGNIFICANT CHANGE UP (ref 10.3–14.5)
SODIUM SERPL-SCNC: 141 MMOL/L — SIGNIFICANT CHANGE UP (ref 135–145)
WBC # BLD: 7.66 K/UL — SIGNIFICANT CHANGE UP (ref 3.8–10.5)
WBC # FLD AUTO: 7.66 K/UL — SIGNIFICANT CHANGE UP (ref 3.8–10.5)

## 2022-06-23 RX ORDER — KETOROLAC TROMETHAMINE 30 MG/ML
15 SYRINGE (ML) INJECTION ONCE
Refills: 0 | Status: DISCONTINUED | OUTPATIENT
Start: 2022-06-23 | End: 2022-06-23

## 2022-06-23 RX ORDER — IBUPROFEN 200 MG
400 TABLET ORAL EVERY 6 HOURS
Refills: 0 | Status: DISCONTINUED | OUTPATIENT
Start: 2022-06-23 | End: 2022-06-24

## 2022-06-23 RX ADMIN — OXYCODONE HYDROCHLORIDE 10 MILLIGRAM(S): 5 TABLET ORAL at 20:21

## 2022-06-23 RX ADMIN — Medication 1000 MILLIGRAM(S): at 06:45

## 2022-06-23 RX ADMIN — Medication 1000 MILLIGRAM(S): at 14:51

## 2022-06-23 RX ADMIN — Medication 15 MILLIGRAM(S): at 06:30

## 2022-06-23 RX ADMIN — Medication 100 MILLIGRAM(S): at 00:08

## 2022-06-23 RX ADMIN — Medication 400 MILLIGRAM(S): at 20:57

## 2022-06-23 RX ADMIN — Medication 15 MILLIGRAM(S): at 12:15

## 2022-06-23 RX ADMIN — Medication 15 MILLIGRAM(S): at 11:45

## 2022-06-23 RX ADMIN — Medication 400 MILLIGRAM(S): at 06:15

## 2022-06-23 RX ADMIN — Medication 15 MILLIGRAM(S): at 06:15

## 2022-06-23 RX ADMIN — Medication 100 MILLIGRAM(S): at 10:30

## 2022-06-23 RX ADMIN — Medication 400 MILLIGRAM(S): at 14:21

## 2022-06-23 RX ADMIN — Medication 400 MILLIGRAM(S): at 18:19

## 2022-06-23 NOTE — PROVIDER CONTACT NOTE (OTHER) - SITUATION
61y/o female s/p b/l mastectomy and ARNOLD procedure yesterday. Received patient from PACU 0800 this AM. Currently out of bed to chair with left shoulder pain.

## 2022-06-24 ENCOUNTER — TRANSCRIPTION ENCOUNTER (OUTPATIENT)
Age: 62
End: 2022-06-24

## 2022-06-24 VITALS
TEMPERATURE: 99 F | OXYGEN SATURATION: 99 % | RESPIRATION RATE: 16 BRPM | SYSTOLIC BLOOD PRESSURE: 118 MMHG | DIASTOLIC BLOOD PRESSURE: 67 MMHG | HEART RATE: 93 BPM

## 2022-06-24 RX ORDER — OXYCODONE HYDROCHLORIDE 5 MG/1
1 TABLET ORAL
Qty: 0 | Refills: 0 | DISCHARGE
Start: 2022-06-24

## 2022-06-24 RX ORDER — IBUPROFEN 200 MG
1 TABLET ORAL
Qty: 0 | Refills: 0 | DISCHARGE
Start: 2022-06-24

## 2022-06-24 RX ORDER — ACETAMINOPHEN 500 MG
3 TABLET ORAL
Qty: 0 | Refills: 0 | DISCHARGE
Start: 2022-06-24

## 2022-06-24 RX ADMIN — OXYCODONE HYDROCHLORIDE 10 MILLIGRAM(S): 5 TABLET ORAL at 10:45

## 2022-06-24 RX ADMIN — Medication 400 MILLIGRAM(S): at 05:58

## 2022-06-24 RX ADMIN — Medication 400 MILLIGRAM(S): at 00:40

## 2022-06-24 NOTE — DISCHARGE NOTE PROVIDER - HOSPITAL COURSE
Bilateral mastectomies with sentinel node biopsies/ARNOLD reconstruction 6/22/2022.  Postoperative course uneventful.

## 2022-06-24 NOTE — DISCHARGE NOTE NURSING/CASE MANAGEMENT/SOCIAL WORK - PATIENT PORTAL LINK FT
You can access the FollowMyHealth Patient Portal offered by [facility] by registering at the following website: [email or web address]. By joining [facility]’s FollowMyHealth portal, you will also be able to view your health information using other applications (apps) compatible with our system. CT of the head was reviewed, no acute intracranial pathology noted

## 2022-06-24 NOTE — DISCHARGE NOTE NURSING/CASE MANAGEMENT/SOCIAL WORK - NSDCPEFALRISK_GEN_ALL_CORE
For information on Fall & Injury Prevention, visit: [email or web address] OR  [email or web address] OR  https://www.cdc.gov/steadi/patient.html

## 2022-06-24 NOTE — DISCHARGE NOTE PROVIDER - CARE PROVIDERS DIRECT ADDRESSES
[email or web address]

## 2022-06-24 NOTE — DISCHARGE NOTE PROVIDER - CARE PROVIDER_API CALL
Mary Cardoza)  Surgery  [address] [place] [address]  Phone: [phone]  Fax: [phone]  Follow Up Time:     Aneudy Henderson)  Plastic Surgery  [address], [address]  [place] [address]  Phone: [phone]  Fax: [phone]  Follow Up Time:

## 2022-06-24 NOTE — PROGRESS NOTE ADULT - SUBJECTIVE AND OBJECTIVE BOX
NAD  AVSS  vioptix 60's %  PE: flaps viable  abd flat, closures intact  drains: ss output  Plan: s/p ARNOLD flap Breast reconstruction  -doing well  -cont drains  -d/c home today  -f/u in office next week  -D/w Dr. Henderson
S:  Comfortable, ambulated    O:  Afebrile, VSS       Breasts soft.  Skin islands pink- sats 59-72%      Abdominal closure intact.      Drains serosang
NAD  Afebrile, VSS  ViOptix 70/80  Bilateral breast free flap viable, closures intact.  Abdomen flat, closure intact.  Drains serosang.  Stable POD 1  Transfer to floor  Advance diet  OOB to chair  Continue ViOptix  
Pt resting in bed, C/O some breast pain L>R,  No Abd pain, dyspnea, CP, N/V    Vital Signs Last 24 Hrs  T(C): 36.1 (22 Jun 2022 17:19), Max: 36.6 (22 Jun 2022 06:53)  T(F): 97 (22 Jun 2022 17:19), Max: 97.9 (22 Jun 2022 06:53)  HR: 93 (22 Jun 2022 21:00) (64 - 109)  BP: 114/69 (22 Jun 2022 21:00) (105/60 - 130/73)  BP(mean): --  RR: 10 (22 Jun 2022 21:00) (10 - 18)  SpO2: 100% (22 Jun 2022 21:00) (95% - 100%)    I&O's Detail    22 Jun 2022 07:01  -  22 Jun 2022 21:36  --------------------------------------------------------  IN:    Other (mL): 3400 mL  Total IN: 3400 mL    OUT:    Bulb (mL): 27 mL    Bulb (mL): 13 mL    Bulb (mL): 35 mL    Bulb (mL): 20 mL    Bulb (mL): 37 mL    Bulb (mL): 22 mL    Indwelling Catheter - Urethral (mL): 130 mL    Other (mL): 450 mL  Total OUT: 734 mL    Total NET: 2666 mL    Gen: NAD    Heart: S1S2 RRR    Lungs CTA B/L    Abdomen: Incisions C/D/I, + BS, soft, NT    Extremities: Venodynes on, no swelling or tenderness    Breasts:   Vioptix on R 61% signal quality 92, L 68% signal quality 89  B/L, Soft, flaps with good cap refill, soft/no swelling, hematoma noted  mild tenderness to palpation above L breast    A/P:  S/P B/L Mastectomies with ARNOLD reconstruction  NPO  IVF  Pain management  DVT PPX  L Vioptix decreased to 61%,then  increased back to 68% with mild pressure and maintaining with 3x3 and tegaderm  placed over Vioptix   continue to monitor in PACU overnight       
S:  Surgical pain overnight relieved with pain meds       O:  Afeb, VSS       Breast skin viable, breasts soft. Skin islands pink- roque 70s       Abdominal closure intact       Drains serosang

## 2022-06-24 NOTE — DISCHARGE NOTE NURSING/CASE MANAGEMENT/SOCIAL WORK - NSDCPETBCESMAN_GEN_ALL_CORE
If you are a smoker, it is important for your health to stop smoking. Please be aware that second hand smoke is also harmful. 4

## 2022-06-24 NOTE — PROGRESS NOTE ADULT - ASSESSMENT
S/p bilateral mastectomies/ARNOLD  Stable for discharge
S/p bilateral mastectomies/SLN biopsies/ARNOLD  Stable for transfer to floor  OOB  Advance diet

## 2022-06-24 NOTE — DISCHARGE NOTE PROVIDER - NSDCMRMEDTOKEN_GEN_ALL_CORE_FT
acetaminophen 325 mg oral tablet: 3 tab(s) orally every 8 hours  ibuprofen 400 mg oral tablet: 1 tab(s) orally every 6 hours  oxyCODONE 10 mg oral tablet: 1 tab(s) orally every 4 hours, As needed, Severe Pain (7 - 10)  oxyCODONE 5 mg oral tablet: 1 tab(s) orally every 4 hours, As needed, Moderate Pain (4 - 6)

## 2022-06-24 NOTE — DISCHARGE NOTE PROVIDER - NSDCFUSCHEDAPPT_GEN_ALL_CORE_FT
Mary Drake  [facility]  BREAST [address]  Scheduled Appointment: 07/01/2022

## 2022-06-27 ENCOUNTER — NON-APPOINTMENT (OUTPATIENT)
Age: 62
End: 2022-06-27

## 2022-06-29 DIAGNOSIS — C50.912 MALIGNANT NEOPLASM OF UNSPECIFIED SITE OF LEFT FEMALE BREAST: ICD-10-CM

## 2022-07-01 ENCOUNTER — APPOINTMENT (OUTPATIENT)
Dept: BREAST CENTER | Facility: CLINIC | Age: 62
End: 2022-07-01

## 2022-07-01 VITALS
BODY MASS INDEX: 28.88 KG/M2 | WEIGHT: 163 LBS | HEIGHT: 63 IN | SYSTOLIC BLOOD PRESSURE: 114 MMHG | HEART RATE: 101 BPM | DIASTOLIC BLOOD PRESSURE: 74 MMHG

## 2022-07-01 PROCEDURE — 99024 POSTOP FOLLOW-UP VISIT: CPT

## 2022-07-01 RX ORDER — MECLIZINE HYDROCHLORIDE 12.5 MG/1
12.5 TABLET ORAL
Qty: 90 | Refills: 0 | Status: ACTIVE | COMMUNITY
Start: 2022-05-20

## 2022-07-01 NOTE — PHYSICAL EXAM
[de-identified] : Circular skin island viable. Breast soft. [de-identified] : Circular skin island viable. Breast soft. [de-identified] : Lower transverse incision and periumbilical incision healing well.

## 2022-07-01 NOTE — DATA REVIEWED
[FreeTextEntry1] : Bilateral mammogram 1/21/2022:  No mammographic or ultrasound evidence of malignancy.\par \par Bilateral breast MRI 4/20/2022 Postlumpectomy changes left anterior outer breast.  Two adjacent enhancing masses 5 and 6 mm, 6 mm apart, rec Ultrasound and core biopsies.\par \par Pathology 4/25/2022:  Left breast 3N3= invasive well diff ductal carcinoma, SBR 5/9 4 mm (heart clip)\par                                     Left 3 N4= invasive well diff ductal carcinoma, SBR 5/9, DCIS, ER >95% ME 0, Her 2 2+ CISH nonamplified. (wing clip)\par \par Surgical pathology 6/22/2022:  Right breast= radial scars, fibrocystic, intramammary node and SLN benign\par                                                   Left breast= 1.3 cm invasive ductal carcinoma, SBR 4/9 with focal DCIS, SLN neg

## 2022-07-01 NOTE — CONSULT LETTER
[Dear  ___] : Dear  [unfilled], [Consult Letter:] : I had the pleasure of evaluating your patient, [unfilled]. [Sincerely,] : Sincerely, [DrSonya  ___] : Dr. SHIELDS

## 2022-07-01 NOTE — HISTORY OF PRESENT ILLNESS
[FreeTextEntry1] : This is a 61 year old female who has a history of left breast cancer at age 50.  She was treated with a lumpectomy and sentinel node biopsy and required a re-excision for margins at [place].  She had a 1.4 cm tubular cancer with extensive DCIS, 6 negative nodes. She received radiation and took tamoxifen for 6 months.  She stopped due to side effects of leg pains and " not feeling well."\par \par She was seen for an initial visit on March 25. She had always had discomfort in the left breast, but also complained of intermittent right breast pain for a few months.  She was also bothered by the left breast deformity.\par \par I sent her for a breast MRI and there were two small abnormalities in the left breast.  Ultrasound guided core biopsies of both sites showed invasive ductal cancer.\par \par She underwent bilateral mastectomies with left sentinel node biopsy and ARNOLD reconstruction in conjunction with Dr. Henderson on 6/22/2022.  The abdominal drains were removed this morning and she feels much better.

## 2022-07-24 ENCOUNTER — OUTPATIENT (OUTPATIENT)
Dept: OUTPATIENT SERVICES | Facility: HOSPITAL | Age: 62
LOS: 1 days | Discharge: HOME | End: 2022-07-24

## 2022-07-24 DIAGNOSIS — C50.919 MALIGNANT NEOPLASM OF UNSPECIFIED SITE OF UNSPECIFIED FEMALE BREAST: ICD-10-CM

## 2022-07-24 DIAGNOSIS — Z98.51 TUBAL LIGATION STATUS: Chronic | ICD-10-CM

## 2022-07-24 DIAGNOSIS — Z98.89 OTHER SPECIFIED POSTPROCEDURAL STATES: Chronic | ICD-10-CM

## 2022-07-26 ENCOUNTER — APPOINTMENT (OUTPATIENT)
Dept: HEMATOLOGY ONCOLOGY | Facility: CLINIC | Age: 62
End: 2022-07-26

## 2022-07-26 VITALS
TEMPERATURE: 98.5 F | OXYGEN SATURATION: 100 % | SYSTOLIC BLOOD PRESSURE: 135 MMHG | HEART RATE: 72 BPM | WEIGHT: 163.38 LBS | DIASTOLIC BLOOD PRESSURE: 86 MMHG | BODY MASS INDEX: 28.59 KG/M2 | RESPIRATION RATE: 18 BRPM | HEIGHT: 63.39 IN

## 2022-07-26 PROCEDURE — 99205 OFFICE O/P NEW HI 60 MIN: CPT

## 2022-07-26 RX ORDER — AMOXICILLIN 500 MG/1
500 TABLET, FILM COATED ORAL
Qty: 21 | Refills: 0 | Status: DISCONTINUED | COMMUNITY
Start: 2022-02-25

## 2022-07-26 RX ORDER — CEFADROXIL 500 MG/1
500 CAPSULE ORAL
Qty: 10 | Refills: 0 | Status: DISCONTINUED | COMMUNITY
Start: 2022-06-13

## 2022-07-26 NOTE — RESULTS/DATA
[FreeTextEntry1] : Ms. Berg is a post-menopausal female who presented at age 62 in July 2022 for evaluation of L breast cancer, multifocal IDC, ER+. \par The patient has a medical history of L breast cancer (age 50), s/p lumpectomy/SLNB, XRT/tamoxifen x 6 months. \par \par Stage 1A L breast multifocal IDC, ER+, lymph node negative. \par \par We discussed the excellent prognosis of stage I, ER positive, node negative breast cancer. We explained that recent data suggests that chemotherapy may be spared for many patients with this type of breast cancer (up to 85%). We discussed the use of Oncotype DX genomic profile to determine the risk of recurrence and benefit from chemotherapy based on the results of the Tailor Rx Study to better determine which patients should receive chemotherapy in addition to hormonal therapy. \par \par Her oncotype score has resulted as 15 with 4% risk of distant recurrence at 9 years and <1% absolute chemotherapy benefit.\par \par No role for chemotherapy, will start arimidex. \par Obtain prior DEXA. Start calcium and vitamin D. \par RTC 1 month for follow up on AI.

## 2022-07-26 NOTE — CONSULT LETTER
[Dear  ___] : Dear  [unfilled], [Consult Letter:] : I had the pleasure of evaluating your patient, [unfilled]. [Please see my note below.] : Please see my note below. [Consult Closing:] : Thank you very much for allowing me to participate in the care of this patient.  If you have any questions, please do not hesitate to contact me. [Sincerely,] : Sincerely, [FreeTextEntry2] : Dr. Drake\par  [FreeTextEntry3] : Dr. Abby Burton\par

## 2022-07-26 NOTE — HISTORY OF PRESENT ILLNESS
[de-identified] : Referred by: Dr. Drake\par Diagnosis: L breast IDC, multifocal \par \par Ms. Berg is a post-menopausal female who presented at age 62 in 2022 for evaluation of L breast cancer, multifocal IDC, ER+. \par The patient has a medical history of L breast cancer (age 50), s/p lumpectomy/SLNB, XRT/tamoxifen x 6 months. \par \par Florencia presents for evaluation after recently diagnosed L breast cancer. The patient had a history of L breast cancer at age 50. She was  treated with a lumpectomy and sentinel node biopsy and required a re-excision for margins at [place]. She had a 1.4 cm tubular cancer with extensive DCIS, 6 negative nodes. She received radiation and took tamoxifen for 6 months, discontinued herself 2/2 side effects and she did not follow up with medical oncology. She then presented in 2022 with bilateral breast discomfort. Breast MRI on 22 revealed two enhancing masses in the L breast, measuring 6mm and 5mm located 6mm from each other, no axillary or internal mammary LAD. She underwent L breast biopsy on 22 which revealed 2 areas of IDC, well differentiated, DCIS, LVI negative, ER 95%, SC 0%, Her 2 2+, FISH negative. PET/CT 22 was negative for metastatic disease. She underwent bilateral mastectomy on 22 which revealed benign changes in the R breast, R SLNB negative 0/3, L breast revealed IDC, well differentiated, measuring 1.3cm, focal DCIS, LVI negative, 0/2. \par \par Her oncotype score has resulted as 15 with 4% risk of distant recurrence at 9 years and <1% absolute chemotherapy benefit.\par \par At this time she is feeling well, is still healing from surgery. Has scant discharge from her belly button. No fevers or chills. She denies headaches, back pain, weight loss. \par \par Imaging: \par Breast MRI: 22 two enhancing masses in the L breast, measuring 6mm and 5mm located 6mm from each other, no axillary or internal mammary LAD. \par PET/CT 22: Nonspecific minimally FDG avid ill-defined soft tissue density adjacent to the clip, central left breast with SUV 1.7, small minimally FDG avid right axillary lymph node, probably benign, no evidence of metastatic disease.\par \par Path: \par Breast biopsy 22: two foci of IDC well differentiated, measuring 4mm and 5mm, DCIS+, LVI negative, ER 95%+, SC 0%, Her 2 2+ FISH NEG \par Bilateral mastectomy 22 which revealed benign changes in the R breast, R SLNB negative 0/3, L breast revealed IDC, well differentiated, measuring 1.3cm, focal DCIS, LVI negative, 0/2. 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, heterozygous \par \par HCM: \par - COVID vaccination: s/p 2 doses and 1 booster \par - Colonoscopy: last colonoscopy 5 years ago ()- normal \par - Gyn: pap 22 negative \par - Mammo: annually \par \par SH: \par - Occupation: works as an assistant \par - Living situation: [place], lives alone, she has 2 grown children \par - Smoking/etoh/illicits: former smoker, quit 40 years ago \par - Exercise: [place], walking \par \par OB/GYN Hx: \par - Age of menarche: 11\par - Age of menopause: 50\par - Pregnancy history: , 43 and 38, grandchildren are 20, 9 and 2 \par - Age at live birth: 21\par - OCP use: brief use \par - Hormonal therapy: n/a\par - Breast feeding history: 9 months \par \par FH: \par - No family history of any cancers

## 2022-07-26 NOTE — PHYSICAL EXAM
[Fully active, able to carry on all pre-disease performance without restriction] : Status 0 - Fully active, able to carry on all pre-disease performance without restriction [Normal] : affect appropriate [de-identified] : well appearing female, NAD, pleasant  [de-identified] : s/p bilateral mastectomy with reconstruction, well healed, no palpable masses or LAD [de-identified] : transverse scar beneath umbilicus, well healed, slight clear/white discharge from umbilicus

## 2022-08-05 ENCOUNTER — APPOINTMENT (OUTPATIENT)
Dept: BREAST CENTER | Facility: CLINIC | Age: 62
End: 2022-08-05

## 2022-08-05 VITALS
DIASTOLIC BLOOD PRESSURE: 93 MMHG | HEART RATE: 74 BPM | BODY MASS INDEX: 28.53 KG/M2 | SYSTOLIC BLOOD PRESSURE: 129 MMHG | WEIGHT: 163 LBS | HEIGHT: 63.5 IN

## 2022-08-05 PROCEDURE — 99024 POSTOP FOLLOW-UP VISIT: CPT

## 2022-08-05 NOTE — HISTORY OF PRESENT ILLNESS
[FreeTextEntry1] : This is a 62 year old female who has a history of left breast cancer at age 50.  She was treated with a lumpectomy and sentinel node biopsy and required a re-excision for margins at [place].  She had a 1.4 cm tubular cancer with extensive DCIS, 6 negative nodes. She received radiation and took tamoxifen for 6 months.  She stopped due to side effects of leg pains and " not feeling well."\par \par She was seen for an initial visit on March 25. She had always had discomfort in the left breast, but also complained of intermittent right breast pain for a few months.  She was also bothered by the left breast deformity.\par \par I sent her for a breast MRI and there were two small abnormalities in the left breast.  Ultrasound guided core biopsies of both sites showed invasive ductal cancer.\par \par She underwent bilateral mastectomies with left sentinel node biopsy and ARNOLD reconstruction in conjunction with Dr. Henderson on 6/22/2022.  She had a 1.3 cm invasive ductal cancer, SBR 4/9, ER >95% GA 0 Her 2 2+ CISH nonamplified, Oncotype score 15. She still has some postsurgical pain that is slowly improving. \par \par She met with Dr. Burton and is on Arimidex.

## 2022-08-05 NOTE — DATA REVIEWED
[FreeTextEntry1] : Bilateral mammogram 1/21/2022:  No mammographic or ultrasound evidence of malignancy.\par \par Bilateral breast MRI 4/20/2022 Postlumpectomy changes left anterior outer breast.  Two adjacent enhancing masses 5 and 6 mm, 6 mm apart, rec Ultrasound and core biopsies.\par \par Pathology 4/25/2022:  Left breast 3N3= invasive well diff ductal carcinoma, SBR 5/9 4 mm (heart clip)\par                                     Left 3 N4= invasive well diff ductal carcinoma, SBR 5/9, DCIS, ER >95% ID 0, Her 2 2+ CISH nonamplified. (wing clip)\par \par Surgical pathology 6/22/2022:  Right breast= radial scars, fibrocystic, intramammary node and SLN benign\par                                                   Left breast= 1.3 cm invasive ductal carcinoma, SBR 4/9 with focal DCIS, SLN neg

## 2022-08-05 NOTE — PHYSICAL EXAM
[de-identified] : Circular skin island viable. Breast soft. [de-identified] : Circular skin island viable. Breast soft. [de-identified] : Lower transverse incision and periumbilical incision healing well.

## 2022-08-23 ENCOUNTER — OUTPATIENT (OUTPATIENT)
Dept: OUTPATIENT SERVICES | Facility: HOSPITAL | Age: 62
LOS: 1 days | End: 2022-08-23
Payer: COMMERCIAL

## 2022-08-23 VITALS
DIASTOLIC BLOOD PRESSURE: 86 MMHG | SYSTOLIC BLOOD PRESSURE: 147 MMHG | TEMPERATURE: 98 F | RESPIRATION RATE: 18 BRPM | HEART RATE: 70 BPM | WEIGHT: 164.02 LBS | HEIGHT: 63 IN | OXYGEN SATURATION: 100 %

## 2022-08-23 DIAGNOSIS — Z85.3 PERSONAL HISTORY OF MALIGNANT NEOPLASM OF BREAST: ICD-10-CM

## 2022-08-23 DIAGNOSIS — Z98.51 TUBAL LIGATION STATUS: Chronic | ICD-10-CM

## 2022-08-23 DIAGNOSIS — Z90.13 ACQUIRED ABSENCE OF BILATERAL BREASTS AND NIPPLES: ICD-10-CM

## 2022-08-23 DIAGNOSIS — Z90.13 ACQUIRED ABSENCE OF BILATERAL BREASTS AND NIPPLES: Chronic | ICD-10-CM

## 2022-08-23 DIAGNOSIS — Z98.89 OTHER SPECIFIED POSTPROCEDURAL STATES: Chronic | ICD-10-CM

## 2022-08-23 DIAGNOSIS — Z01.818 ENCOUNTER FOR OTHER PREPROCEDURAL EXAMINATION: ICD-10-CM

## 2022-08-23 LAB
ANION GAP SERPL CALC-SCNC: 9 MMOL/L — SIGNIFICANT CHANGE UP (ref 5–17)
BUN SERPL-MCNC: 13 MG/DL — SIGNIFICANT CHANGE UP (ref 7–23)
CALCIUM SERPL-MCNC: 9 MG/DL — SIGNIFICANT CHANGE UP (ref 8.4–10.5)
CHLORIDE SERPL-SCNC: 104 MMOL/L — SIGNIFICANT CHANGE UP (ref 96–108)
CO2 SERPL-SCNC: 26 MMOL/L — SIGNIFICANT CHANGE UP (ref 22–31)
CREAT SERPL-MCNC: 0.62 MG/DL — SIGNIFICANT CHANGE UP (ref 0.5–1.3)
EGFR: 102 ML/MIN/1.73M2 — SIGNIFICANT CHANGE UP
GLUCOSE SERPL-MCNC: 105 MG/DL — HIGH (ref 70–99)
HCT VFR BLD CALC: 38.6 % — SIGNIFICANT CHANGE UP (ref 34.5–45)
HGB BLD-MCNC: 12.6 G/DL — SIGNIFICANT CHANGE UP (ref 11.5–15.5)
MCHC RBC-ENTMCNC: 28.2 PG — SIGNIFICANT CHANGE UP (ref 27–34)
MCHC RBC-ENTMCNC: 32.6 GM/DL — SIGNIFICANT CHANGE UP (ref 32–36)
MCV RBC AUTO: 86.4 FL — SIGNIFICANT CHANGE UP (ref 80–100)
NRBC # BLD: 0 /100 WBCS — SIGNIFICANT CHANGE UP (ref 0–0)
PLATELET # BLD AUTO: 280 K/UL — SIGNIFICANT CHANGE UP (ref 150–400)
POTASSIUM SERPL-MCNC: 4.2 MMOL/L — SIGNIFICANT CHANGE UP (ref 3.5–5.3)
POTASSIUM SERPL-SCNC: 4.2 MMOL/L — SIGNIFICANT CHANGE UP (ref 3.5–5.3)
RBC # BLD: 4.47 M/UL — SIGNIFICANT CHANGE UP (ref 3.8–5.2)
RBC # FLD: 13.9 % — SIGNIFICANT CHANGE UP (ref 10.3–14.5)
SODIUM SERPL-SCNC: 139 MMOL/L — SIGNIFICANT CHANGE UP (ref 135–145)
WBC # BLD: 5.49 K/UL — SIGNIFICANT CHANGE UP (ref 3.8–10.5)
WBC # FLD AUTO: 5.49 K/UL — SIGNIFICANT CHANGE UP (ref 3.8–10.5)

## 2022-08-23 PROCEDURE — 80048 BASIC METABOLIC PNL TOTAL CA: CPT

## 2022-08-23 PROCEDURE — 36415 COLL VENOUS BLD VENIPUNCTURE: CPT

## 2022-08-23 PROCEDURE — G0463: CPT

## 2022-08-23 PROCEDURE — 93010 ELECTROCARDIOGRAM REPORT: CPT | Mod: NC

## 2022-08-23 PROCEDURE — 85027 COMPLETE CBC AUTOMATED: CPT

## 2022-08-23 PROCEDURE — 93005 ELECTROCARDIOGRAM TRACING: CPT

## 2022-08-23 NOTE — H&P PST ADULT - NEUROLOGICAL
Physical Therapy  Facility/Department: [address]  Physical Therapy Initial Assessment / treatment / discharge    Name: Mar Marvin MD  : 1935  MRN: [id number]  Date of Service: 2022    Discharge Recommendations: Mar Marvin MD scored a  on the AM-PAC short mobility form. At this time, no further PT is recommended upon discharge. Recommend patient returns to prior setting with prior services. PT Equipment Recommendations  Equipment Needed: No      Patient Diagnosis(es): The primary encounter diagnosis was Anemia, unspecified type. A diagnosis of Gastrointestinal hemorrhage with melena was also pertinent to this visit. Past Medical History:  has a past medical history of Anemia, Aortic valve disorders, Aspiration pneumonia ([address] Erectile dysfunction, Esophageal cancer ([address] Hernia, femoral, bilateral, Hyperlipidemia, Osteoporosis, senile, Pancreatitis, Patient underweight, Prostate cancer ([address] and Unspecified essential hypertension. Past Surgical History:  has a past surgical history that includes Coronary artery bypass graft (); Cardiac valve replacement (); eye surgery (& ); Appendectomy; bone graft; gastrectomy; Cholecystectomy; Colonoscopy (2009); Prostate surgery; Tonsillectomy; Esophagus surgery; US Prostate/Transrectal/Vol Collins Brachyth; Colonoscopy (10/05/15); Upper gastrointestinal endoscopy (N/A, 2021); Colonoscopy (N/A, 2021); Colonoscopy (2021); and Upper gastrointestinal endoscopy (N/A, 2022). Assessment   Body Structures, Functions, Activity Limitations Requiring Skilled Therapeutic Intervention: Decreased functional mobility   Assessment: Pt is 82 y.o. male admit with GI bleed. Pt requires SBA to CGA for safe bed mobility, transfers, and ambulation with use of RW. Verbally discussed negotiation of step with pt and wife - both verb good understanding of safe technique.   Pt plans to return home today with Within Functional Limits  Cognition  Cognition Comment: appears to have a good awareness of his abilities and limitations     Objective               AROM RLE (degrees)  RLE AROM: WFL  AROM LLE (degrees)  LLE AROM : WFL  Strength RLE  Strength RLE: WFL  Strength LLE  Strength LLE: WFL           Bed mobility  Supine to Sit: Stand by assistance (HOB flat)  Scooting: Stand by assistance  Transfers  Sit to Stand: Contact guard assistance  Stand to sit: Contact guard assistance  Ambulation  Device: Rolling Walker  Assistance: Contact guard assistance  Quality of Gait: kyphotic posture, demos toe heel gait - v/c given to correct with min improvement, fairly steady overall  Gait Deviations: Slow Gena;Decreased step length;Decreased step height  Distance: 80 ft; 10 ft     Balance  Sitting - Static: Good  Sitting - Dynamic: Good  Standing - Static: Fair  Standing - Dynamic: Fair         Treatment included gait and transfer training, pt education. OutComes Score                                                  -PAC Score  AM-PAC Inpatient Mobility Raw Score : 18 (04/25/22 1340)  -PAC Inpatient T-Scale Score : 43.63 (04/25/22 1340)  Mobility Inpatient CMS 0-100% Score: 46.58 (04/25/22 1340)  Mobility Inpatient CMS G-Code Modifier : CK (04/25/22 1340)          Goals          Therapy Time   Individual Concurrent Group Co-treatment   Time In 1152         Time Out 1230         Minutes 38                 Timed Code Treatment Minutes:  23    Total Treatment Minutes:  38    If patient is discharged prior to next treatment, this note will serve as the discharge summary.   Roland Jon, PT, DPT  [id number] negative normal/cranial nerves II-XII intact/sensation intact

## 2022-08-23 NOTE — H&P PST ADULT - FUNCTIONAL ASSESSMENT - BASIC MOBILITY 6.
----- Message from Daxa Mckeon sent at 10/13/2020 11:04 AM CDT -----  Regarding: Pharmacy  Contact: Destini from Medicine cabinet  Type:  Pharmacy Calling to Clarify an RX    Name of Caller:  Destini  Pharmacy Name:  [facility]  Prescription Name:  dulaglutide (TRULICITY) 1.5 mg/0.5 mL pen injector  What do they need to clarify?:  Clarification on the directions  Best Call Back Number:  [phone]  Additional Information:         4 = No assist / stand by assistance

## 2022-08-23 NOTE — H&P PST ADULT - NSICDXPASTMEDICALHX_GEN_ALL_CORE_FT
PAST MEDICAL HISTORY:  Breast cancer in left breast, surgery , radiation--in remission until recurrence 4/2022, now s/p mastectomy with reconstruction    History of 2019 novel coronavirus disease (COVID-19) 8/2021 - mild case

## 2022-08-23 NOTE — H&P PST ADULT - NSANTHOSAYNRD_GEN_A_CORE
neck 14.5 inches/No. FAM screening performed.  STOP BANG Legend: 0-2 = LOW Risk; 3-4 = INTERMEDIATE Risk; 5-8 = HIGH Risk

## 2022-08-23 NOTE — H&P PST ADULT - HISTORY OF PRESENT ILLNESS
61 y/o female with PMH of left breast cancer ( s/p radiation and lumpectomy for 1st occurrence now s/p mastectomy 6/2022) presents for RUST.  Scheduled for breast reconstruction following mastectomy in Jun of this year.  Patient is feeling well at PST with no recent cough, fever or illness, reports that she did well with first procedure and has recovered without complication.  Now scheduled for bilateral revision reconstruction, bilateral nipple reconstruction, abdominal scar revision, fat grafting from abdomen to breast with Dr Henderson on 09/01/2022.  COVID-19 testing information provided.

## 2022-08-23 NOTE — H&P PST ADULT - PROBLEM SELECTOR PLAN 1
Scheduled for bilateral revision reconstruction, bilateral nipple reconstruction, abdominal scar revision, fat grafting from abdomen to breast with Dr Henderson on 09/01/2022.  COVID-19 testing information provided.  Pre op instructions given and patient verbalized understanding.  CBC, BMP, EKG and medical clearance pending.  NPO after midnight night before procedure.  To stop all ASA, vitamins NSAIDs, and supplements 1 week prior to procedure.  Chlorhexidene wash given with instructions.

## 2022-08-23 NOTE — H&P PST ADULT - NSICDXPASTSURGICALHX_GEN_ALL_CORE_FT
PAST SURGICAL HISTORY:  H/O bilateral mastectomy 6/2022    H/O tubal ligation age 32    S/P lumpectomy, left breast with lymph node dissection--7/2011

## 2022-08-23 NOTE — H&P PST ADULT - VENOUS THROMBOEMBOLISM FOR WOMEN ONLY
pt confused, unable to utilize./Yes-Patient/Caregiver accepts free interpretation services...
(0) indicator not present

## 2022-08-23 NOTE — H&P PST ADULT - FALL HARM RISK - UNIVERSAL INTERVENTIONS
Bed in lowest position, wheels locked, appropriate side rails in place/Call bell, personal items and telephone in reach/Instruct patient to call for assistance before getting out of bed or chair/Non-slip footwear when patient is out of bed/[place] to call system/Physically safe environment - no spills, clutter or unnecessary equipment/Purposeful Proactive Rounding/Room/bathroom lighting operational, light cord in reach

## 2022-08-29 LAB — SARS-COV-2 N GENE NPH QL NAA+PROBE: NOT DETECTED

## 2022-08-30 NOTE — PATIENT PROFILE ADULT - NSPROPTRIGHTCAREGIVER_GEN_A_NUR
[Normal Growth] : growth [Normal Development] : development  [No Elimination Concerns] : elimination [Normal Sleep Pattern] : sleep [Physical Growth and Development] : physical growth and development [Social and Academic Competence] : social and academic competence [Emotional Well-Being] : emotional well-being [Risk Reduction] : risk reduction [Violence and Injury Prevention] : violence and injury prevention [Mother] : mother [Full Activity without restrictions including Physical Education & Athletics] : Full Activity without restrictions including Physical Education & Athletics [FreeTextEntry1] : monitor menses, increase healthy calories and fats\par depression/ anxiety: good relationship with parents and school counselor, will refer to psych for more support/ counseling  declines

## 2022-08-31 ENCOUNTER — TRANSCRIPTION ENCOUNTER (OUTPATIENT)
Age: 62
End: 2022-08-31

## 2022-09-01 ENCOUNTER — OUTPATIENT (OUTPATIENT)
Dept: OUTPATIENT SERVICES | Facility: HOSPITAL | Age: 62
LOS: 1 days | End: 2022-09-01
Payer: COMMERCIAL

## 2022-09-01 ENCOUNTER — TRANSCRIPTION ENCOUNTER (OUTPATIENT)
Age: 62
End: 2022-09-01

## 2022-09-01 ENCOUNTER — RESULT REVIEW (OUTPATIENT)
Age: 62
End: 2022-09-01

## 2022-09-01 VITALS
DIASTOLIC BLOOD PRESSURE: 82 MMHG | HEART RATE: 74 BPM | WEIGHT: 164.02 LBS | HEIGHT: 63 IN | OXYGEN SATURATION: 99 % | SYSTOLIC BLOOD PRESSURE: 132 MMHG | RESPIRATION RATE: 14 BRPM | TEMPERATURE: 97 F

## 2022-09-01 VITALS
RESPIRATION RATE: 14 BRPM | HEART RATE: 88 BPM | TEMPERATURE: 98 F | SYSTOLIC BLOOD PRESSURE: 111 MMHG | DIASTOLIC BLOOD PRESSURE: 74 MMHG | OXYGEN SATURATION: 96 %

## 2022-09-01 DIAGNOSIS — Z85.3 PERSONAL HISTORY OF MALIGNANT NEOPLASM OF BREAST: ICD-10-CM

## 2022-09-01 DIAGNOSIS — Z98.89 OTHER SPECIFIED POSTPROCEDURAL STATES: Chronic | ICD-10-CM

## 2022-09-01 DIAGNOSIS — Z98.51 TUBAL LIGATION STATUS: Chronic | ICD-10-CM

## 2022-09-01 DIAGNOSIS — Z90.13 ACQUIRED ABSENCE OF BILATERAL BREASTS AND NIPPLES: Chronic | ICD-10-CM

## 2022-09-01 DIAGNOSIS — Z90.13 ACQUIRED ABSENCE OF BILATERAL BREASTS AND NIPPLES: ICD-10-CM

## 2022-09-01 PROCEDURE — 19380 REVJ RECONSTRUCTED BREAST: CPT | Mod: 50

## 2022-09-01 PROCEDURE — 88304 TISSUE EXAM BY PATHOLOGIST: CPT

## 2022-09-01 PROCEDURE — 88304 TISSUE EXAM BY PATHOLOGIST: CPT | Mod: 26

## 2022-09-01 RX ORDER — HYDROMORPHONE HYDROCHLORIDE 2 MG/ML
0.5 INJECTION INTRAMUSCULAR; INTRAVENOUS; SUBCUTANEOUS
Refills: 0 | Status: DISCONTINUED | OUTPATIENT
Start: 2022-09-01 | End: 2022-09-01

## 2022-09-01 RX ORDER — SODIUM CHLORIDE 9 MG/ML
1000 INJECTION, SOLUTION INTRAVENOUS
Refills: 0 | Status: DISCONTINUED | OUTPATIENT
Start: 2022-09-01 | End: 2022-09-01

## 2022-09-01 RX ORDER — HYDROMORPHONE HYDROCHLORIDE 2 MG/ML
1 INJECTION INTRAMUSCULAR; INTRAVENOUS; SUBCUTANEOUS
Refills: 0 | Status: DISCONTINUED | OUTPATIENT
Start: 2022-09-01 | End: 2022-09-01

## 2022-09-01 RX ORDER — ONDANSETRON 8 MG/1
4 TABLET, FILM COATED ORAL ONCE
Refills: 0 | Status: DISCONTINUED | OUTPATIENT
Start: 2022-09-01 | End: 2022-09-01

## 2022-09-01 RX ADMIN — HYDROMORPHONE HYDROCHLORIDE 1 MILLIGRAM(S): 2 INJECTION INTRAMUSCULAR; INTRAVENOUS; SUBCUTANEOUS at 11:04

## 2022-09-01 RX ADMIN — SODIUM CHLORIDE 50 MILLILITER(S): 9 INJECTION, SOLUTION INTRAVENOUS at 06:14

## 2022-09-01 NOTE — BRIEF OPERATIVE NOTE - NSICDXBRIEFPROCEDURE_GEN_ALL_CORE_FT
PROCEDURES:  Revision, reconstruction, breast, bilateral, using fat graft 01-Sep-2022 10:49:01  Heidi Shell  Reconstruction of both nipples 01-Sep-2022 10:49:25  Heidi Shell

## 2022-09-01 NOTE — ASU DISCHARGE PLAN (ADULT/PEDIATRIC) - PROCEDURE
bilateral breast revision reconstruction with fat grafting from abdomen to bilateral breasts, bilateral nipple reconstruction, abdominal scar revision

## 2022-09-01 NOTE — ASU DISCHARGE PLAN (ADULT/PEDIATRIC) - BATHING
you may shower in 48 hours, allow soap and water to run over your incisions but do not scrub. pat dry gently and replace bra loosely and abdominal binder. No soaking, swimming or bathing for 2 weeks.

## 2022-09-01 NOTE — BRIEF OPERATIVE NOTE - NSICDXBRIEFPREOP_GEN_ALL_CORE_FT
PRE-OP DIAGNOSIS:  Acquired absence of both breasts 01-Sep-2022 10:52:09  Heidi Shell  Personal history of breast cancer 01-Sep-2022 10:52:17  Heidi Shell

## 2022-09-01 NOTE — ASU PATIENT PROFILE, ADULT - VISION (WITH CORRECTIVE LENSES IF THE PATIENT USUALLY WEARS THEM):
Normal vision: sees adequately in most situations; can see medication labels, newsprint eyeglasses/Normal vision: sees adequately in most situations; can see medication labels, newsprint

## 2022-09-01 NOTE — ASU DISCHARGE PLAN (ADULT/PEDIATRIC) - NS MD DC FALL RISK RISK
For information on Fall & Injury Prevention, visit: [email or web address] OR  [email or web address] OR  https://www.cdc.gov/steadi/patient.html

## 2022-09-01 NOTE — BRIEF OPERATIVE NOTE - NSICDXBRIEFPOSTOP_GEN_ALL_CORE_FT
POST-OP DIAGNOSIS:  Acquired absence of both breasts 01-Sep-2022 10:52:34  Heidi Shell  Personal history of breast cancer 01-Sep-2022 10:52:41  Heidi Shell

## 2022-09-01 NOTE — ASU PATIENT PROFILE, ADULT - FALL HARM RISK - UNIVERSAL INTERVENTIONS
Bed in lowest position, wheels locked, appropriate side rails in place/Call bell, personal items and telephone in reach/Instruct patient to call for assistance before getting out of bed or chair/Non-slip footwear when patient is out of bed/Carrier to call system/Physically safe environment - no spills, clutter or unnecessary equipment/Purposeful Proactive Rounding/Room/bathroom lighting operational, light cord in reach

## 2022-09-01 NOTE — ASU DISCHARGE PLAN (ADULT/PEDIATRIC) - CARE PROVIDER_API CALL
Aneudy Henderson)  Plastic Surgery  [address], [address]  [place] [address]  Phone: [phone]  Fax: [phone]  Established Patient  Follow Up Time: 1 week

## 2022-09-01 NOTE — BRIEF OPERATIVE NOTE - COMMENTS
abdominoplasty scar revision, bilateral breast revision reconstruction with fat grafting to both breasts from abdomen, bilateral nipple reconstruction

## 2022-09-06 ENCOUNTER — RESULT REVIEW (OUTPATIENT)
Age: 62
End: 2022-09-06

## 2022-09-06 ENCOUNTER — APPOINTMENT (OUTPATIENT)
Dept: HEMATOLOGY ONCOLOGY | Facility: CLINIC | Age: 62
End: 2022-09-06

## 2022-09-06 VITALS
WEIGHT: 164.06 LBS | BODY MASS INDEX: 28.61 KG/M2 | RESPIRATION RATE: 17 BRPM | DIASTOLIC BLOOD PRESSURE: 85 MMHG | HEART RATE: 81 BPM | OXYGEN SATURATION: 100 % | SYSTOLIC BLOOD PRESSURE: 125 MMHG | TEMPERATURE: 97.8 F

## 2022-09-06 DIAGNOSIS — C50.412 MALIGNANT NEOPLASM OF UPPER-OUTER QUADRANT OF LEFT FEMALE BREAST: ICD-10-CM

## 2022-09-06 PROBLEM — Z86.16 PERSONAL HISTORY OF COVID-19: Chronic | Status: ACTIVE | Noted: 2022-08-23

## 2022-09-06 LAB — 24R-OH-CALCIDIOL SERPL-MCNC: 45.9 NG/ML — SIGNIFICANT CHANGE UP (ref 30–80)

## 2022-09-06 PROCEDURE — 99214 OFFICE O/P EST MOD 30 MIN: CPT

## 2022-09-06 NOTE — RESULTS/DATA
[FreeTextEntry1] : Ms. Berg is a post-menopausal female who presented at age 62 in July 2022 for evaluation of L breast cancer, multifocal IDC, ER+. \par The patient has a medical history of L breast cancer (age 50), s/p lumpectomy/SLNB, XRT/tamoxifen x 6 months. \par \par Stage 1A L breast multifocal IDC, ER+, lymph node negative. \par \par We discussed the excellent prognosis of stage I, ER positive, node negative breast cancer. We explained that recent data suggests that chemotherapy may be spared for many patients with this type of breast cancer (up to 85%). We discussed the use of Oncotype DX genomic profile to determine the risk of recurrence and benefit from chemotherapy based on the results of the [employer]or Rx Study to better determine which patients should receive chemotherapy in addition to hormonal therapy. \par \par Her oncotype score has resulted as 15 with 4% risk of distant recurrence at 9 years and <1% absolute chemotherapy benefit.\par \par No role for chemotherapy, arimidex started 7/27/22. \par DEXA 1/2022 with osteopenia. Discussed zometa. Will need dental clearance. \par Continue calcium and vitamin D. \par RTC 3 months.

## 2022-09-06 NOTE — HISTORY OF PRESENT ILLNESS
[de-identified] : Referred by: Dr. Drake\par \par Breast Cancer Summary: \par DIAGNOSIS:  L breast IDC, multifocal \par PROCEDURE AND DATE: Bilateral mastectomy 22\par PATHOLOGY:  L breast revealed IDC, well differentiated, measuring 1.3cm, focal DCIS, LVI negative, 0/2. R breast benign. \par STAGE:  anatomic stage IA, pathologic prognostic stage IA\par POSTOPERATIVE TREATMENT:\par Chemotherapy: Oncotype 15, chemotherapy not indicated \par Radiation: Not indicated s/p mastectomy\par Hormonal: Arimidex \par STATUS: BRICE \par BRCA/Genetics STATUS: [employer] 3/25/22- No pathogenic variants, VUS in POLD1, heterozygous \par \par Ms. Berg is a post-menopausal female who presented at age 62 in 2022 for evaluation of L breast cancer, multifocal IDC, ER+. \par The patient has a medical history of L breast cancer (age 50), s/p lumpectomy/SLNB, XRT/tamoxifen x 6 months. \par \par Florencia presents for evaluation after recently diagnosed L breast cancer. The patient had a history of L breast cancer at age 50. She was  treated with a lumpectomy and sentinel node biopsy and required a re-excision for margins at [place]. She had a 1.4 cm tubular cancer with extensive DCIS, 6 negative nodes. She received radiation and took tamoxifen for 6 months, discontinued herself 2/2 side effects and she did not follow up with medical oncology. She then presented in 2022 with bilateral breast discomfort. Breast MRI on 22 revealed two enhancing masses in the L breast, measuring 6mm and 5mm located 6mm from each other, no axillary or internal mammary LAD. She underwent L breast biopsy on 22 which revealed 2 areas of IDC, well differentiated, DCIS, LVI negative, ER 95%, FL 0%, Her 2 2+, FISH negative. PET/CT 22 was negative for metastatic disease. She underwent bilateral mastectomy on 22 which revealed benign changes in the R breast, R SLNB negative 0/3, L breast revealed IDC, well differentiated, measuring 1.3cm, focal DCIS, LVI negative, 0/2. \par \par Her oncotype score has resulted as 15 with 4% risk of distant recurrence at 9 years and <1% absolute chemotherapy benefit.\par \par At this time she is feeling well, is still healing from surgery. Has scant discharge from her belly button. No fevers or chills. She denies headaches, back pain, weight loss. \par \par Imaging: \par Breast MRI: 22 two enhancing masses in the L breast, measuring 6mm and 5mm located 6mm from each other, no axillary or internal mammary LAD. \par PET/CT 22: Nonspecific minimally FDG avid ill-defined soft tissue density adjacent to the clip, central left breast with SUV 1.7, small minimally FDG avid right axillary lymph node, probably benign, no evidence of metastatic disease.\par \par Path: \par Breast biopsy 22: two foci of IDC well differentiated, measuring 4mm and 5mm, DCIS+, LVI negative, ER 95%+, FL 0%, Her 2 2+ FISH NEG \par Bilateral mastectomy 22 which revealed benign changes in the R breast, R SLNB negative 0/3, L breast revealed IDC, well differentiated, measuring 1.3cm, focal DCIS, LVI negative, 0/2. \par \par HCM: \par - COVID vaccination: s/p J&J and 1 booster \par - Colonoscopy: last colonoscopy 5 years ago ()- normal \par - Gyn: pap 22 negative \par - Mammo: annually \par \par SH: \par - Occupation: works as an assistant \par - Living situation: [place], lives alone, she has 2 grown children \par - Smoking/etoh/illicits: former smoker, quit 40 years ago \par - Exercise: [place], walking \par \par OB/GYN Hx: \par - Age of menarche: 11\par - Age of menopause: 50\par - Pregnancy history: , 43 and 38, grandchildren are 20, 9 and 2 \par - Age at live birth: 21\par - OCP use: brief use \par - Hormonal therapy: n/a\par - Breast feeding history: 9 months \par \par FH: \par - No family history of any cancers  [de-identified] : Florencia presents for follow up on 9/6/22 for L breast cancer, multifocal, on AI. \par \par She initiated arimidex on 7/27/22 and is tolerating it fairly well. She does report hot flashes but they do not wake her from sleep. She is not interested in additional medications or acupuncture at this time. She reports restless legs and headaches- unclear if these are related to the AI. She denies vaginal dryness or joint pains.  She underwent breast revision last week and is still healing. She remains on calcium and vitamin D. She denies fevers, chills, CP, SOB, n/v/d, LE swelling. \par \par Health Care Maintenance: Updated 9/6/22\par Mammogram: Bilateral mastectomy 6/22/22\par Colonoscopy:  last colonoscopy 5 years ago (2017)- normal \par Pap smear: Dr. Jones, needs [facility], pap 1/2022, UTD \par Dentist: will make appt \par PCP: Dr. 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

## 2022-09-06 NOTE — PHYSICAL EXAM
[Fully active, able to carry on all pre-disease performance without restriction] : Status 0 - Fully active, able to carry on all pre-disease performance without restriction [Normal] : affect appropriate [de-identified] : well appearing female, NAD, pleasant  [de-identified] : s/p bilateral mastectomy with reconstruction, well healed, no palpable masses or LAD [de-identified] : transverse scar beneath umbilicus, well healed

## 2022-09-08 LAB — SURGICAL PATHOLOGY STUDY: SIGNIFICANT CHANGE UP

## 2022-11-22 ENCOUNTER — APPOINTMENT (OUTPATIENT)
Dept: BREAST CENTER | Facility: CLINIC | Age: 62
End: 2022-11-22

## 2022-11-22 VITALS
DIASTOLIC BLOOD PRESSURE: 90 MMHG | WEIGHT: 162 LBS | BODY MASS INDEX: 28.35 KG/M2 | SYSTOLIC BLOOD PRESSURE: 143 MMHG | HEIGHT: 63.5 IN | HEART RATE: 69 BPM

## 2022-11-22 PROCEDURE — 99213 OFFICE O/P EST LOW 20 MIN: CPT

## 2022-11-22 RX ORDER — OXYCODONE AND ACETAMINOPHEN 5; 325 MG/1; MG/1
5-325 TABLET ORAL
Qty: 30 | Refills: 0 | Status: DISCONTINUED | COMMUNITY
Start: 2022-08-17

## 2022-11-22 NOTE — HISTORY OF PRESENT ILLNESS
[FreeTextEntry1] : This is a 62 year old female who has a history of left breast cancer at age 50.  She was treated with a lumpectomy and sentinel node biopsy and required a re-excision for margins at [facility].  She had a 1.4 cm tubular cancer with extensive DCIS, 6 negative nodes. She received radiation and took tamoxifen for 6 months.  She stopped due to side effects of leg pains and " not feeling well."\par \par She was seen for an initial visit on March 25. She had always had discomfort in the left breast, but also complained of intermittent right breast pain for a few months.  She was also bothered by the left breast deformity.\par \par I sent her for a breast MRI and there were two small abnormalities in the left breast.  Ultrasound guided core biopsies of both sites showed invasive ductal cancer.\par \par She underwent bilateral mastectomies with left sentinel node biopsy and ARNOLD reconstruction in conjunction with Dr. Henderson on 6/22/2022.  She had a 1.3 cm invasive ductal cancer, SBR 4/9, ER >95% NV 0 Her 2 2+ CISH nonamplified, Oncotype score 15. She had a revision and is scheduled for another on Dec 23. She still has skin numbness and episodic shooting pains.\par \par She is on Arimidex- Dr. Burton.

## 2022-11-22 NOTE — DATA REVIEWED
[FreeTextEntry1] : Pathology 4/25/2022:  Left breast 3N3= invasive well diff ductal carcinoma, SBR 5/9 4 mm (heart clip)\par                                     Left 3 N4= invasive well diff ductal carcinoma, SBR 5/9, DCIS, ER >95% DE 0, Her 2 2+ CISH nonamplified. (wing clip)\par \par Surgical pathology 6/22/2022:  Right breast= radial scars, fibrocystic, intramammary node and SLN benign\par                                                   Left breast= 1.3 cm invasive ductal carcinoma, SBR 4/9 with focal DCIS, SLN neg

## 2022-11-22 NOTE — PHYSICAL EXAM
[EOMI] : extra ocular movement intact [Sclera nonicteric] : sclera nonicteric [Supple] : supple [No Supraclavicular Adenopathy] : no supraclavicular adenopathy [No Cervical Adenopathy] : no cervical adenopathy [No Thyromegaly] : no thyromegaly [Clear to Auscultation Bilat] : clear to auscultation bilaterally [Examined in the supine and seated position] : examined in the supine and seated position [No dominant masses] : no dominant masses in right breast  [No dominant masses] : no dominant masses left breast [No Axillary Lymphadenopathy] : no left axillary lymphadenopathy [de-identified] : Mild asymmetry. [de-identified] : Circular skin island  with nipple reconstruction. Mild edema inferiorly [de-identified] : Circular skin island with nipple reconstruction. [de-identified] : Lower transverse scar.

## 2022-12-08 ENCOUNTER — OUTPATIENT (OUTPATIENT)
Dept: OUTPATIENT SERVICES | Facility: HOSPITAL | Age: 62
LOS: 1 days | Discharge: HOME | End: 2022-12-08

## 2022-12-08 DIAGNOSIS — Z90.13 ACQUIRED ABSENCE OF BILATERAL BREASTS AND NIPPLES: Chronic | ICD-10-CM

## 2022-12-08 DIAGNOSIS — Z98.51 TUBAL LIGATION STATUS: Chronic | ICD-10-CM

## 2022-12-08 DIAGNOSIS — Z98.89 OTHER SPECIFIED POSTPROCEDURAL STATES: Chronic | ICD-10-CM

## 2022-12-08 DIAGNOSIS — C50.919 MALIGNANT NEOPLASM OF UNSPECIFIED SITE OF UNSPECIFIED FEMALE BREAST: ICD-10-CM

## 2022-12-14 ENCOUNTER — OUTPATIENT (OUTPATIENT)
Dept: OUTPATIENT SERVICES | Facility: HOSPITAL | Age: 62
LOS: 1 days | End: 2022-12-14
Payer: COMMERCIAL

## 2022-12-14 VITALS
WEIGHT: 166.89 LBS | DIASTOLIC BLOOD PRESSURE: 86 MMHG | OXYGEN SATURATION: 100 % | SYSTOLIC BLOOD PRESSURE: 147 MMHG | RESPIRATION RATE: 16 BRPM | HEART RATE: 78 BPM | TEMPERATURE: 98 F | HEIGHT: 63 IN

## 2022-12-14 DIAGNOSIS — Z01.818 ENCOUNTER FOR OTHER PREPROCEDURAL EXAMINATION: ICD-10-CM

## 2022-12-14 DIAGNOSIS — Z98.89 OTHER SPECIFIED POSTPROCEDURAL STATES: Chronic | ICD-10-CM

## 2022-12-14 DIAGNOSIS — Z98.51 TUBAL LIGATION STATUS: Chronic | ICD-10-CM

## 2022-12-14 DIAGNOSIS — Z90.13 ACQUIRED ABSENCE OF BILATERAL BREASTS AND NIPPLES: Chronic | ICD-10-CM

## 2022-12-14 DIAGNOSIS — Z98.890 OTHER SPECIFIED POSTPROCEDURAL STATES: Chronic | ICD-10-CM

## 2022-12-14 DIAGNOSIS — Z90.13 ACQUIRED ABSENCE OF BILATERAL BREASTS AND NIPPLES: ICD-10-CM

## 2022-12-14 LAB
ANION GAP SERPL CALC-SCNC: 5 MMOL/L — SIGNIFICANT CHANGE UP (ref 5–17)
APPEARANCE UR: CLEAR — SIGNIFICANT CHANGE UP
BASOPHILS # BLD AUTO: 0.05 K/UL — SIGNIFICANT CHANGE UP (ref 0–0.2)
BASOPHILS NFR BLD AUTO: 0.8 % — SIGNIFICANT CHANGE UP (ref 0–2)
BILIRUB UR-MCNC: NEGATIVE — SIGNIFICANT CHANGE UP
BUN SERPL-MCNC: 13 MG/DL — SIGNIFICANT CHANGE UP (ref 7–23)
CALCIUM SERPL-MCNC: 9.3 MG/DL — SIGNIFICANT CHANGE UP (ref 8.5–10.1)
CHLORIDE SERPL-SCNC: 107 MMOL/L — SIGNIFICANT CHANGE UP (ref 96–108)
CO2 SERPL-SCNC: 26 MMOL/L — SIGNIFICANT CHANGE UP (ref 22–31)
COLOR SPEC: YELLOW — SIGNIFICANT CHANGE UP
CREAT SERPL-MCNC: 0.62 MG/DL — SIGNIFICANT CHANGE UP (ref 0.5–1.3)
DIFF PNL FLD: NEGATIVE — SIGNIFICANT CHANGE UP
EGFR: 102 ML/MIN/1.73M2 — SIGNIFICANT CHANGE UP
EOSINOPHIL # BLD AUTO: 0.09 K/UL — SIGNIFICANT CHANGE UP (ref 0–0.5)
EOSINOPHIL NFR BLD AUTO: 1.5 % — SIGNIFICANT CHANGE UP (ref 0–6)
GLUCOSE SERPL-MCNC: 123 MG/DL — HIGH (ref 70–99)
GLUCOSE UR QL: NEGATIVE — SIGNIFICANT CHANGE UP
HCT VFR BLD CALC: 41.4 % — SIGNIFICANT CHANGE UP (ref 34.5–45)
HGB BLD-MCNC: 13.7 G/DL — SIGNIFICANT CHANGE UP (ref 11.5–15.5)
IMM GRANULOCYTES NFR BLD AUTO: 0.8 % — SIGNIFICANT CHANGE UP (ref 0–0.9)
KETONES UR-MCNC: NEGATIVE — SIGNIFICANT CHANGE UP
LEUKOCYTE ESTERASE UR-ACNC: NEGATIVE — SIGNIFICANT CHANGE UP
LYMPHOCYTES # BLD AUTO: 1.87 K/UL — SIGNIFICANT CHANGE UP (ref 1–3.3)
LYMPHOCYTES # BLD AUTO: 30.3 % — SIGNIFICANT CHANGE UP (ref 13–44)
MCHC RBC-ENTMCNC: 28.5 PG — SIGNIFICANT CHANGE UP (ref 27–34)
MCHC RBC-ENTMCNC: 33.1 GM/DL — SIGNIFICANT CHANGE UP (ref 32–36)
MCV RBC AUTO: 86.1 FL — SIGNIFICANT CHANGE UP (ref 80–100)
MONOCYTES # BLD AUTO: 0.38 K/UL — SIGNIFICANT CHANGE UP (ref 0–0.9)
MONOCYTES NFR BLD AUTO: 6.2 % — SIGNIFICANT CHANGE UP (ref 2–14)
NEUTROPHILS # BLD AUTO: 3.73 K/UL — SIGNIFICANT CHANGE UP (ref 1.8–7.4)
NEUTROPHILS NFR BLD AUTO: 60.4 % — SIGNIFICANT CHANGE UP (ref 43–77)
NITRITE UR-MCNC: NEGATIVE — SIGNIFICANT CHANGE UP
PH UR: 6 — SIGNIFICANT CHANGE UP (ref 5–8)
PLATELET # BLD AUTO: 359 K/UL — SIGNIFICANT CHANGE UP (ref 150–400)
POTASSIUM SERPL-MCNC: 4.3 MMOL/L — SIGNIFICANT CHANGE UP (ref 3.5–5.3)
POTASSIUM SERPL-SCNC: 4.3 MMOL/L — SIGNIFICANT CHANGE UP (ref 3.5–5.3)
PROT UR-MCNC: NEGATIVE — SIGNIFICANT CHANGE UP
RBC # BLD: 4.81 M/UL — SIGNIFICANT CHANGE UP (ref 3.8–5.2)
RBC # FLD: 13.3 % — SIGNIFICANT CHANGE UP (ref 10.3–14.5)
SODIUM SERPL-SCNC: 138 MMOL/L — SIGNIFICANT CHANGE UP (ref 135–145)
SP GR SPEC: 1.01 — SIGNIFICANT CHANGE UP (ref 1.01–1.02)
UROBILINOGEN FLD QL: NEGATIVE — SIGNIFICANT CHANGE UP
WBC # BLD: 6.17 K/UL — SIGNIFICANT CHANGE UP (ref 3.8–10.5)
WBC # FLD AUTO: 6.17 K/UL — SIGNIFICANT CHANGE UP (ref 3.8–10.5)

## 2022-12-14 PROCEDURE — 85025 COMPLETE CBC W/AUTO DIFF WBC: CPT

## 2022-12-14 PROCEDURE — 93010 ELECTROCARDIOGRAM REPORT: CPT

## 2022-12-14 PROCEDURE — 36415 COLL VENOUS BLD VENIPUNCTURE: CPT

## 2022-12-14 PROCEDURE — 99214 OFFICE O/P EST MOD 30 MIN: CPT | Mod: 25

## 2022-12-14 PROCEDURE — 93005 ELECTROCARDIOGRAM TRACING: CPT

## 2022-12-14 PROCEDURE — 80048 BASIC METABOLIC PNL TOTAL CA: CPT

## 2022-12-14 PROCEDURE — 81003 URINALYSIS AUTO W/O SCOPE: CPT

## 2022-12-14 NOTE — H&P PST ADULT - HISTORY OF PRESENT ILLNESS
62 year old female diagnosed with breast cancer s/p bilateral mastectomy s/p ARNOLD flap c/o breast asymmetry and pain; she presents to PST for planned bilateral breast revision fat grafting

## 2022-12-14 NOTE — H&P PST ADULT - ASSESSMENT
Plan:  1. PST instructions given ; NPO status/  instructions to be given by ASU   2. Pt instructed to take following meds on day of surgery:   3. Pt instructed to take routine evening medications unless indicated   4. Stop NSAIDS ( Aspirin Alev Motrin Mobic Diclofenac), herbal supplements , MVI , Vitamin fish oil 7 days prior to surgery  unless   directed by surgeon or cardiologist;   5. Medical Optimization  with    6. EZ wash instructions given & mupirocin instructions given  7. Labs EKG CXR as per surgeon request   8. Pt instructed to self quarantine after Covid test   9. Covid Testing scheduled Pt notified and aware  10. Pt denies covid symptoms shortness of breath fever cough    62 year old female diagnosed with breast cancer s/p bilateral mastectomy s/p ARNOLD flap c/o breast asymmetry and pain; she presents to PST for planned bilateral breast revision fat grafting       Plan:  1. PST instructions given ; NPO status/  instructions to be given by ASU   2. Pt instructed to take following meds on day of surgery: none   3. Pt instructed to take routine evening medications unless indicated   4. Stop NSAIDS ( Aspirin Alev Motrin Mobic Diclofenac), herbal supplements , MVI , Vitamin fish oil 7 days prior to surgery  unless   directed by surgeon or cardiologist;   5. Medical Optimization  not indicated  6. EZ wash instructions given   7. Labs EKG  as per surgeon request   8. Pt instructed to self quarantine after Covid test   9. Covid Testing scheduled Pt notified and aware  10. Pt denies covid symptoms shortness of breath fever cough

## 2022-12-14 NOTE — H&P PST ADULT - NSICDXPASTMEDICALHX_GEN_ALL_CORE_FT
PAST MEDICAL HISTORY:  Breast cancer in left breast, surgery , radiation--in remission until recurrence 4/2022, now s/p mastectomy with reconstruction    History of 2019 novel coronavirus disease (COVID-19) 8/2021 - mild case    S/P radiation therapy

## 2022-12-14 NOTE — H&P PST ADULT - NSANTHOSAYNRD_GEN_A_CORE
No. FAM screening performed.  STOP BANG Legend: 0-2 = LOW Risk; 3-4 = INTERMEDIATE Risk; 5-8 = HIGH Risk

## 2022-12-14 NOTE — H&P PST ADULT - HEIGHT IN CM
ADD ON FOR 7/20/22 AT 10:30AM FOR F/U AFTER SURGERY WITH DR MURRELL      E-VERIFIED     THANK YOU,     EL 160.02

## 2022-12-14 NOTE — H&P PST ADULT - NSICDXPASTSURGICALHX_GEN_ALL_CORE_FT
PAST SURGICAL HISTORY:  H/O bilateral mastectomy 6/2022    H/O tubal ligation age 32    S/P lumpectomy, left breast with lymph node dissection--7/2011     PAST SURGICAL HISTORY:  H/O bilateral mastectomy 6/2022    H/O tubal ligation age 32    History of breast reconstruction     S/P lumpectomy, left breast with lymph node dissection--7/2011

## 2022-12-15 DIAGNOSIS — Z01.818 ENCOUNTER FOR OTHER PREPROCEDURAL EXAMINATION: ICD-10-CM

## 2022-12-15 DIAGNOSIS — Z90.13 ACQUIRED ABSENCE OF BILATERAL BREASTS AND NIPPLES: ICD-10-CM

## 2022-12-20 ENCOUNTER — APPOINTMENT (OUTPATIENT)
Dept: HEMATOLOGY ONCOLOGY | Facility: CLINIC | Age: 62
End: 2022-12-20

## 2022-12-20 VITALS
HEART RATE: 81 BPM | OXYGEN SATURATION: 99 % | RESPIRATION RATE: 18 BRPM | DIASTOLIC BLOOD PRESSURE: 89 MMHG | WEIGHT: 169 LBS | TEMPERATURE: 98.4 F | BODY MASS INDEX: 29.47 KG/M2 | SYSTOLIC BLOOD PRESSURE: 148 MMHG

## 2022-12-20 PROCEDURE — 99214 OFFICE O/P EST MOD 30 MIN: CPT

## 2022-12-20 NOTE — HISTORY OF PRESENT ILLNESS
[de-identified] : Referred by: Dr. Drake\par \par Breast Cancer Summary: \par DIAGNOSIS:  L breast IDC, multifocal \par PROCEDURE AND DATE: Bilateral mastectomy 22\par PATHOLOGY:  L breast revealed IDC, well differentiated, measuring 1.3cm, focal DCIS, LVI negative, 0/2. R breast benign. \par STAGE:  anatomic stage IA, pathologic prognostic stage IA\par POSTOPERATIVE TREATMENT:\par Chemotherapy: Oncotype 15, chemotherapy not indicated \par Radiation: Not indicated s/p mastectomy\par Hormonal: Arimidex \par STATUS: BRICE \par BRCA/Genetics STATUS: [employer]ae 3/25/22- No pathogenic variants, VUS in POLD1, heterozygous \par \par Ms. Berg is a post-menopausal female who initially presented at age 62 in 2022 for evaluation of L breast cancer, multifocal IDC, ER+. \par The patient has a medical history of L breast cancer (age 50), s/p lumpectomy/SLNB, XRT/tamoxifen x 6 months. \par \par Florencia presented for evaluation after recently diagnosed L breast cancer. The patient had a history of L breast cancer at age 50. She was  treated with a lumpectomy and sentinel node biopsy and required a re-excision for margins at [place]. She had a 1.4 cm tubular cancer with extensive DCIS, 6 negative nodes. She received radiation and took tamoxifen for 6 months, discontinued herself 2/2 side effects and she did not follow up with medical oncology. She then presented in 2022 with bilateral breast discomfort. Breast MRI on 22 revealed two enhancing masses in the L breast, measuring 6mm and 5mm located 6mm from each other, no axillary or internal mammary LAD. She underwent L breast biopsy on 22 which revealed 2 areas of IDC, well differentiated, DCIS, LVI negative, ER 95%, IA 0%, Her 2 2+, FISH negative. PET/CT 22 was negative for metastatic disease. She underwent bilateral mastectomy on 22 which revealed benign changes in the R breast, R SLNB negative 0/3, L breast revealed IDC, well differentiated, measuring 1.3cm, focal DCIS, LVI negative, 0/2. \par \par Her Oncotype score resulted as 15 with 4% risk of distant recurrence at 9 years and <1% absolute chemotherapy benefit.\par \par Imaging: \par Breast MRI: 22 two enhancing masses in the L breast, measuring 6mm and 5mm located 6mm from each other, no axillary or internal mammary LAD. \par PET/CT 22: Nonspecific minimally FDG avid ill-defined soft tissue density adjacent to the clip, central left breast with SUV 1.7, small minimally FDG avid right axillary lymph node, probably benign, no evidence of metastatic disease.\par \par Path: \par Breast biopsy 22: two foci of IDC well differentiated, measuring 4mm and 5mm, DCIS+, LVI negative, ER 95%+, IA 0%, Her 2 2+ FISH NEG \par Bilateral mastectomy 22 which revealed benign changes in the R breast, R SLNB negative 0/3, L breast revealed IDC, well differentiated, measuring 1.3cm, focal DCIS, LVI negative, 0/2. \par \par HCM: \par - COVID vaccination: s/p J&J and 1 booster \par - Colonoscopy: last colonoscopy 5 years ago ()- normal \par - Gyn: pap 22 negative \par - Mammo: annually \par \par SH: \par - Occupation: works as an assistant \par - Living situation: [place], lives alone, she has 2 grown children \par - Smoking/etoh/illicits: former smoker, quit 40 years ago \par - Exercise: [place], walking \par \par OB/GYN Hx: \par - Age of menarche: 11\par - Age of menopause: 50\par - Pregnancy history: , 43 and 38, grandchildren are 20, 9 and 2 \par - Age at live birth: 21\par - OCP use: brief use \par - Hormonal therapy: n/a\par - Breast feeding history: 9 months \par \par FH: \par - No family history of any cancers  [de-identified] : Florencia presents for follow up on 12/20/22 for L breast cancer, multifocal, on AI. \par Initiated on arimidex 7/27/22\par \par At today's visit she is feeling well overall.  Continues Arimidex daily.  Hot flushes persist but they do not wake her from sleep. She is not interested in additional medications or acupuncture at this time.  She denies vaginal dryness or joint pains.  Will see dentist again in late January to proceed with implant. Planning for Zometa once dental clearance obtained.  She remains on calcium and vitamin D.  Hx abdominal flap reconstruction.  Will return to OR with Dr. Capps on 12/23 for revision of right breast for pain/asymmetry.   She denies fevers, chills, CP, SOB, n/v/d, LE swelling. \par \par Health Care Maintenance: Updated 12/20/22\par Mammogram: Bilateral mastectomy 6/22/22\par Colonoscopy:  last colonoscopy 5 years ago (2017) - normal - planned to repeat in 2023\par Pap smear: 1/2022, UTD \par Dentist: 11/22 (in process of obtaining implant)\par PCP: Dr. De Luna 8/2022, UTD \par Dermatology screen: seen 8/2022, UTD \par Genetic screen: Invitae 3/25/22- No pathogenic variants, VUS in POLD1, heterozygous \par DEXA: 1/21/22- osteopenia \par \par Vaccinations: \par COVID vaccination: s/p J&J and 1 booster \par Flu vaccine: DUE

## 2022-12-20 NOTE — RESULTS/DATA
[FreeTextEntry1] : Ms. Berg is a post-menopausal female who presented at age 62 in July 2022 for evaluation of L breast cancer, multifocal IDC, ER+. \par The patient has a medical history of L breast cancer (age 50), s/p lumpectomy/SLNB, XRT/tamoxifen x 6 months. \par \par Stage 1A L breast multifocal IDC, ER+, lymph node negative. \par \par We discussed the excellent prognosis of stage I, ER positive, node negative breast cancer. We explained that recent data suggests that chemotherapy may be spared for many patients with this type of breast cancer (up to 85%). We discussed the use of Oncotype DX genomic profile to determine the risk of recurrence and benefit from chemotherapy based on the results of the Tailor Rx Study to better determine which patients should receive chemotherapy in addition to hormonal therapy. \par \par Her oncotype score has resulted as 15 with 4% risk of distant recurrence at 9 years and <1% absolute chemotherapy benefit.\par \par No role for chemotherapy, arimidex started 7/27/22. \par DEXA 1/2022 with osteopenia. Discussed zometa.  Awaiting dental clearance. \par Continue calcium and vitamin D. \par RTC May 2023

## 2022-12-20 NOTE — PHYSICAL EXAM
[Fully active, able to carry on all pre-disease performance without restriction] : Status 0 - Fully active, able to carry on all pre-disease performance without restriction [Normal] : affect appropriate [de-identified] : well appearing female, NAD, pleasant  [de-identified] : s/p bilateral mastectomy with abdominal flap reconstruction, well healed, no LAD.  Dense tissue bilaterally, no discreet masses.  Notable asymmetry (R > L). [de-identified] : transverse scar beneath umbilicus, well healed

## 2022-12-21 DIAGNOSIS — C50.412 MALIGNANT NEOPLASM OF UPPER-OUTER QUADRANT OF LEFT FEMALE BREAST: ICD-10-CM

## 2022-12-23 ENCOUNTER — OUTPATIENT (OUTPATIENT)
Dept: INPATIENT UNIT | Facility: HOSPITAL | Age: 62
LOS: 1 days | Discharge: HOME | End: 2022-12-23
Payer: COMMERCIAL

## 2022-12-23 ENCOUNTER — TRANSCRIPTION ENCOUNTER (OUTPATIENT)
Age: 62
End: 2022-12-23

## 2022-12-23 VITALS
RESPIRATION RATE: 16 BRPM | WEIGHT: 166.89 LBS | SYSTOLIC BLOOD PRESSURE: 148 MMHG | DIASTOLIC BLOOD PRESSURE: 96 MMHG | HEIGHT: 63 IN | TEMPERATURE: 97 F | HEART RATE: 74 BPM | OXYGEN SATURATION: 100 %

## 2022-12-23 VITALS
RESPIRATION RATE: 17 BRPM | DIASTOLIC BLOOD PRESSURE: 78 MMHG | TEMPERATURE: 97 F | HEART RATE: 91 BPM | OXYGEN SATURATION: 100 % | SYSTOLIC BLOOD PRESSURE: 126 MMHG

## 2022-12-23 DIAGNOSIS — Z90.13 ACQUIRED ABSENCE OF BILATERAL BREASTS AND NIPPLES: ICD-10-CM

## 2022-12-23 DIAGNOSIS — Z98.51 TUBAL LIGATION STATUS: Chronic | ICD-10-CM

## 2022-12-23 DIAGNOSIS — N65.0 DEFORMITY OF RECONSTRUCTED BREAST: ICD-10-CM

## 2022-12-23 DIAGNOSIS — Z98.89 OTHER SPECIFIED POSTPROCEDURAL STATES: Chronic | ICD-10-CM

## 2022-12-23 DIAGNOSIS — Z90.13 ACQUIRED ABSENCE OF BILATERAL BREASTS AND NIPPLES: Chronic | ICD-10-CM

## 2022-12-23 DIAGNOSIS — Z87.891 PERSONAL HISTORY OF NICOTINE DEPENDENCE: ICD-10-CM

## 2022-12-23 DIAGNOSIS — Z98.890 OTHER SPECIFIED POSTPROCEDURAL STATES: Chronic | ICD-10-CM

## 2022-12-23 DIAGNOSIS — N64.1 FAT NECROSIS OF BREAST: ICD-10-CM

## 2022-12-23 DIAGNOSIS — Z86.16 PERSONAL HISTORY OF COVID-19: ICD-10-CM

## 2022-12-23 DIAGNOSIS — Z85.3 PERSONAL HISTORY OF MALIGNANT NEOPLASM OF BREAST: ICD-10-CM

## 2022-12-23 DIAGNOSIS — Z92.3 PERSONAL HISTORY OF IRRADIATION: ICD-10-CM

## 2022-12-23 PROCEDURE — 99202 OFFICE O/P NEW SF 15 MIN: CPT

## 2022-12-23 PROCEDURE — 88305 TISSUE EXAM BY PATHOLOGIST: CPT | Mod: 26

## 2022-12-23 PROCEDURE — 88305 TISSUE EXAM BY PATHOLOGIST: CPT

## 2022-12-23 RX ORDER — ONDANSETRON 8 MG/1
4 TABLET, FILM COATED ORAL ONCE
Refills: 0 | Status: DISCONTINUED | OUTPATIENT
Start: 2022-12-23 | End: 2022-12-23

## 2022-12-23 RX ORDER — SODIUM CHLORIDE 9 MG/ML
1000 INJECTION, SOLUTION INTRAVENOUS
Refills: 0 | Status: DISCONTINUED | OUTPATIENT
Start: 2022-12-23 | End: 2022-12-23

## 2022-12-23 RX ORDER — OXYCODONE HYDROCHLORIDE 5 MG/1
5 TABLET ORAL ONCE
Refills: 0 | Status: DISCONTINUED | OUTPATIENT
Start: 2022-12-23 | End: 2022-12-23

## 2022-12-23 RX ORDER — ACETAMINOPHEN 500 MG
1000 TABLET ORAL ONCE
Refills: 0 | Status: COMPLETED | OUTPATIENT
Start: 2022-12-23 | End: 2022-12-23

## 2022-12-23 RX ORDER — FENTANYL CITRATE 50 UG/ML
50 INJECTION INTRAVENOUS
Refills: 0 | Status: DISCONTINUED | OUTPATIENT
Start: 2022-12-23 | End: 2022-12-23

## 2022-12-23 RX ADMIN — OXYCODONE HYDROCHLORIDE 5 MILLIGRAM(S): 5 TABLET ORAL at 19:00

## 2022-12-23 RX ADMIN — OXYCODONE HYDROCHLORIDE 5 MILLIGRAM(S): 5 TABLET ORAL at 18:23

## 2022-12-23 RX ADMIN — Medication 1000 MILLIGRAM(S): at 18:30

## 2022-12-23 RX ADMIN — FENTANYL CITRATE 50 MICROGRAM(S): 50 INJECTION INTRAVENOUS at 18:10

## 2022-12-23 RX ADMIN — FENTANYL CITRATE 50 MICROGRAM(S): 50 INJECTION INTRAVENOUS at 18:30

## 2022-12-23 RX ADMIN — Medication 400 MILLIGRAM(S): at 18:11

## 2022-12-23 NOTE — PROVIDER CONTACT NOTE (OTHER) - ASSESSMENT
alert & oriented X4, pt c/o pain under breast/incision sites bilateral, and back area.   /82, 125/75, P 81, 83, oral T 97.6, 97.9, R 14-16, O2 sat 100% room air.

## 2022-12-23 NOTE — PROVIDER CONTACT NOTE (OTHER) - ACTION/TREATMENT ORDERED:
Await arrival of PA to assess under left breast, blood oozing. Await arrival of PA to assess under left breast, blood oozing.  PA applied silver nitrate sticks X3 to cease oozing., then applied a pressure dressing.

## 2022-12-23 NOTE — ASU PATIENT PROFILE, ADULT - FALL HARM RISK - UNIVERSAL INTERVENTIONS
Bed in lowest position, wheels locked, appropriate side rails in place/Call bell, personal items and telephone in reach/Instruct patient to call for assistance before getting out of bed or chair/Non-slip footwear when patient is out of bed/[place] to call system/Physically safe environment - no spills, clutter or unnecessary equipment/Purposeful Proactive Rounding/Room/bathroom lighting operational, light cord in reach

## 2022-12-23 NOTE — ASU PATIENT PROFILE, ADULT - NSICDXPASTSURGICALHX_GEN_ALL_CORE_FT
PAST SURGICAL HISTORY:  H/O bilateral mastectomy 6/2022    H/O tubal ligation age 32    History of breast reconstruction     S/P lumpectomy, left breast with lymph node dissection--7/2011

## 2022-12-23 NOTE — ASU DISCHARGE PLAN (ADULT/PEDIATRIC) - COMMENTS
Please call and schedule a follow up appointment in 1 week if you do not already have one established.

## 2022-12-23 NOTE — ASU DISCHARGE PLAN (ADULT/PEDIATRIC) - NS MD DC FALL RISK RISK
For information on Fall & Injury Prevention, visit: [email or web address] OR  [email or web address] OR  https://www.cdc.gov/steadi/patient.html

## 2022-12-23 NOTE — ASU DISCHARGE PLAN (ADULT/PEDIATRIC) - CARE PROVIDER_API CALL
Aneudy Henderson)  Plastic Surgery  [address], [address]  [place] [address]  Phone: [phone]  Fax: [phone]  Established Patient  Follow Up Time: 1 week

## 2022-12-23 NOTE — ASU DISCHARGE PLAN (ADULT/PEDIATRIC) - ASU DC SPECIAL INSTRUCTIONSFT
You may take your dressings down and shower in 48 hours.  Allow soap and water to run over your incisions but do not scrub. Pat dry.  Wear bra and binder at all times while not showering.  Be aware of signs and symptoms of wound infection such as redness and warmth around your incisions, purulent drainage or fevers > 100.4 degrees F.  Please do not drink alcohol, drive or operate heavy machinery while taking your narcotic. Take a stool softener while taking narcotic.  No ice or heat to breasts.    Please call your surgeon with any questions or concerns.

## 2022-12-23 NOTE — PROGRESS NOTE ADULT - SUBJECTIVE AND OBJECTIVE BOX
Called by nurse and asked to evaluate patient's left breast due to some drainage on her dressing.  Patient lying in bed at a 45 degree angle with support bra in place.  Bra removed and a small amount of blood was noted on the abd pad at the inferior aspect of her left breast.    Anterior chest:  Inferior aspect of left breast with a 1cm incision.  A small amount bleeding noted from the incision site.  Pressure held x 5 minutes to affected region.  Bleeding was sporadic and minimal.  Silver nitrate applied to wound edge and hemostasis was achieved.  4x4 pressure dressings and abd pad placed prior to placing support bra back on.      A/P: s/p bilateral breast revisions and fat grafting with small amount of bleeding noted at left inferior chest wall site.    -- hemostasis achieved  -- pressure dressing placed  -- keep bra and dressings in place as directed  -- patient's  to check dressing tonight after patient gets home for bleeding

## 2022-12-29 LAB — SURGICAL PATHOLOGY STUDY: SIGNIFICANT CHANGE UP

## 2023-01-23 NOTE — ASU DISCHARGE PLAN (ADULT/PEDIATRIC) - "IF YOU OR YOUR GUARDIAN/FAMILY IS A SMOKER, IT IS IMPORTANT FOR YOUR HEALTH TO STOP SMOKING. PLEASE BE AWARE THAT SECOND HAND SMOKE IS ALSO HARMFUL."
Ravindra Rose was seen and treated in our emergency department on 1/23/2023. He may return to school on 01/25/2023. If you have any questions or concerns, please don't hesitate to call.       Ale Greenberg PA-C
Statement Selected

## 2023-05-04 ENCOUNTER — OUTPATIENT (OUTPATIENT)
Dept: OUTPATIENT SERVICES | Facility: HOSPITAL | Age: 63
LOS: 1 days | Discharge: HOME | End: 2023-05-04

## 2023-05-04 DIAGNOSIS — Z90.13 ACQUIRED ABSENCE OF BILATERAL BREASTS AND NIPPLES: Chronic | ICD-10-CM

## 2023-05-04 DIAGNOSIS — Z98.89 OTHER SPECIFIED POSTPROCEDURAL STATES: Chronic | ICD-10-CM

## 2023-05-04 DIAGNOSIS — C50.919 MALIGNANT NEOPLASM OF UNSPECIFIED SITE OF UNSPECIFIED FEMALE BREAST: ICD-10-CM

## 2023-05-04 DIAGNOSIS — Z98.890 OTHER SPECIFIED POSTPROCEDURAL STATES: Chronic | ICD-10-CM

## 2023-05-04 DIAGNOSIS — Z98.51 TUBAL LIGATION STATUS: Chronic | ICD-10-CM

## 2023-05-05 PROBLEM — Z92.3 PERSONAL HISTORY OF IRRADIATION: Chronic | Status: ACTIVE | Noted: 2022-12-14

## 2023-05-09 ENCOUNTER — APPOINTMENT (OUTPATIENT)
Dept: HEMATOLOGY ONCOLOGY | Facility: CLINIC | Age: 63
End: 2023-05-09
Payer: COMMERCIAL

## 2023-05-09 ENCOUNTER — APPOINTMENT (OUTPATIENT)
Dept: BREAST CENTER | Facility: CLINIC | Age: 63
End: 2023-05-09
Payer: COMMERCIAL

## 2023-05-09 VITALS
HEIGHT: 63 IN | TEMPERATURE: 97.5 F | OXYGEN SATURATION: 99 % | BODY MASS INDEX: 29.42 KG/M2 | SYSTOLIC BLOOD PRESSURE: 128 MMHG | WEIGHT: 166.06 LBS | RESPIRATION RATE: 18 BRPM | HEART RATE: 76 BPM | DIASTOLIC BLOOD PRESSURE: 85 MMHG

## 2023-05-09 VITALS
WEIGHT: 159 LBS | HEART RATE: 80 BPM | DIASTOLIC BLOOD PRESSURE: 88 MMHG | BODY MASS INDEX: 28.17 KG/M2 | SYSTOLIC BLOOD PRESSURE: 125 MMHG | HEIGHT: 63 IN

## 2023-05-09 PROCEDURE — 99213 OFFICE O/P EST LOW 20 MIN: CPT

## 2023-05-09 PROCEDURE — 99214 OFFICE O/P EST MOD 30 MIN: CPT

## 2023-05-09 NOTE — RESULTS/DATA
[FreeTextEntry1] : Ms. Berg is a post-menopausal female who presented at age 62 in July 2022 for evaluation of L breast cancer, multifocal IDC, ER+. \par The patient has a medical history of L breast cancer (age 50), s/p lumpectomy/SLNB, XRT/tamoxifen x 6 months. \par \par Stage 1A L breast multifocal IDC, ER+, lymph node negative. \par \par We discussed the excellent prognosis of stage I, ER positive, node negative breast cancer. We explained that recent data suggests that chemotherapy may be spared for many patients with this type of breast cancer (up to 85%). We discussed the use of Oncotype DX genomic profile to determine the risk of recurrence and benefit from chemotherapy based on the results of the Tailor Rx Study to better determine which patients should receive chemotherapy in addition to hormonal therapy. \par \par Her oncotype score has resulted as 15 with 4% risk of distant recurrence at 9 years and <1% absolute chemotherapy benefit.\par \par No role for chemotherapy, arimidex started 7/27/22. \par DEXA 1/2022 with osteopenia. Discussed zometa. Will need dental clearance. \par Continue calcium and vitamin D. \par Tonic water for leg cramps. \par Refer to Dr. Cortés for AI side effects- hot flashes, leg discomfort. Declines oxybutynin at this time. \par If symptoms persist, consider switching to exemestane. \par RTC 3 months.

## 2023-05-09 NOTE — PHYSICAL EXAM
[Fully active, able to carry on all pre-disease performance without restriction] : Status 0 - Fully active, able to carry on all pre-disease performance without restriction [Normal] : affect appropriate [de-identified] : well appearing female, NAD, pleasant  [de-identified] : s/p bilateral mastectomy with reconstruction, well healed, no palpable masses or LAD [de-identified] : transverse scar beneath umbilicus, well healed

## 2023-05-09 NOTE — PHYSICAL EXAM
[EOMI] : extra ocular movement intact [Sclera nonicteric] : sclera nonicteric [Supple] : supple [No Supraclavicular Adenopathy] : no supraclavicular adenopathy [No Cervical Adenopathy] : no cervical adenopathy [No Thyromegaly] : no thyromegaly [Clear to Auscultation Bilat] : clear to auscultation bilaterally [Examined in the supine and seated position] : examined in the supine and seated position [No dominant masses] : no dominant masses in right breast  [No dominant masses] : no dominant masses left breast [No Axillary Lymphadenopathy] : no left axillary lymphadenopathy [de-identified] : Mild asymmetry. [de-identified] : Circular skin island  with nipple reconstruction.  [de-identified] : Circular skin island with nipple reconstruction. [de-identified] : Lower transverse scar.

## 2023-05-09 NOTE — DATA REVIEWED
[FreeTextEntry1] : Pathology 4/25/2022:  Left breast 3N3= invasive well diff ductal carcinoma, SBR 5/9 4 mm (heart clip)\par                                     Left 3 N4= invasive well diff ductal carcinoma, SBR 5/9, DCIS, ER >95% HI 0, Her 2 2+ CISH nonamplified. (wing clip)\par \par Surgical pathology 6/22/2022:  Right breast= radial scars, fibrocystic, intramammary node and SLN benign\par                                                   Left breast= 1.3 cm invasive ductal carcinoma, SBR 4/9 with focal DCIS, SLN neg

## 2023-05-09 NOTE — HISTORY OF PRESENT ILLNESS
[de-identified] : Referred by: Dr. Drake\par \par Breast Cancer Summary: \par DIAGNOSIS:  L breast IDC, multifocal \par PROCEDURE AND DATE: Bilateral mastectomy 22\par PATHOLOGY:  L breast revealed IDC, well differentiated, measuring 1.3cm, focal DCIS, LVI negative, 0/2. R breast benign. \par STAGE:  anatomic stage IA, pathologic prognostic stage IA\par POSTOPERATIVE TREATMENT:\par Chemotherapy: Oncotype 15, chemotherapy not indicated \par Radiation: Not indicated s/p mastectomy\par Hormonal: Arimidex initiated 22\par STATUS: BRICE \par BRCA/Genetics STATUS: Invitae 3/25/22- No pathogenic variants, VUS in POLD1, heterozygous \par \par Ms. Berg is a post-menopausal female who presented at age 62 in 2022 for evaluation of L breast cancer, multifocal IDC, ER+. \par The patient has a medical history of L breast cancer (age 50), s/p lumpectomy/SLNB, XRT/tamoxifen x 6 months. \par \par Florencia presents for evaluation after recently diagnosed L breast cancer. The patient had a history of L breast cancer at age 50. She was  treated with a lumpectomy and sentinel node biopsy and required a re-excision for margins at [place]. She had a 1.4 cm tubular cancer with extensive DCIS, 6 negative nodes. She received radiation and took tamoxifen for 6 months, discontinued herself 2/2 side effects and she did not follow up with medical oncology. She then presented in 2022 with bilateral breast discomfort. Breast MRI on 22 revealed two enhancing masses in the L breast, measuring 6mm and 5mm located 6mm from each other, no axillary or internal mammary LAD. She underwent L breast biopsy on 22 which revealed 2 areas of IDC, well differentiated, DCIS, LVI negative, ER 95%, OR 0%, Her 2 2+, FISH negative. PET/CT 22 was negative for metastatic disease. She underwent bilateral mastectomy on 22 which revealed benign changes in the R breast, R SLNB negative 0/3, L breast revealed IDC, well differentiated, measuring 1.3cm, focal DCIS, LVI negative, 0/2. \par \par Her oncotype score has resulted as 15 with 4% risk of distant recurrence at 9 years and <1% absolute chemotherapy benefit.\par \par At this time she is feeling well, is still healing from surgery. Has scant discharge from her belly button. No fevers or chills. She denies headaches, back pain, weight loss. \par \par Imaging: \par Breast MRI: 22 two enhancing masses in the L breast, measuring 6mm and 5mm located 6mm from each other, no axillary or internal mammary LAD. \par PET/CT 22: Nonspecific minimally FDG avid ill-defined soft tissue density adjacent to the clip, central left breast with SUV 1.7, small minimally FDG avid right axillary lymph node, probably benign, no evidence of metastatic disease.\par \par Path: \par Breast biopsy 22: two foci of IDC well differentiated, measuring 4mm and 5mm, DCIS+, LVI negative, ER 95%+, OR 0%, Her 2 2+ FISH NEG \par Bilateral mastectomy 22 which revealed benign changes in the R breast, R SLNB negative 0/3, L breast revealed IDC, well differentiated, measuring 1.3cm, focal DCIS, LVI negative, 0/2. \par \par HCM: \par - COVID vaccination: s/p J&J and 1 booster \par - Colonoscopy: last colonoscopy 5 years ago ()- normal \par - Gyn: pap 22 negative \par - Mammo: annually \par \par SH: \par - Occupation: works as an assistant \par - Living situation: [place], lives alone, she has 2 grown children \par - Smoking/etoh/illicits: former smoker, quit 40 years ago \par - Exercise: [place], walking \par \par OB/GYN Hx: \par - Age of menarche: 11\par - Age of menopause: 50\par - Pregnancy history: , 43 and 38, grandchildren are 20, 9 and 2 \par - Age at live birth: 21\par - OCP use: brief use \par - Hormonal therapy: n/a\par - Breast feeding history: 9 months \par \par FH: \par - No family history of any cancers  [de-identified] : Florencia presents for follow up on 5/9/23 for L breast cancer, multifocal, on AI. \par Initiated on arimidex 7/27/22. \par \par She initiated arimidex on 7/27/22 - still having significant hot flashes, which do interfere with sleep. Denies vaginal dryness. She is experiencing leg cramps/restless legs especially at night. She is still pending dental work- has one more implant to take care of this week. She is taking calcium and vitamin D. She denies vaginal dryness or joint pains. She denies fevers, chills, CP, SOB, n/v/d, LE swelling. \par \par Health Care Maintenance: Updated 5/9/23\par Mammogram: Bilateral mastectomy 6/22/22\par Colonoscopy:  last colonoscopy 5 years ago (2017)- DUE \par Pap smear: Dr. Jones, needs [facility], pap 1/2022, UTD, has gyn appt in June 2023 \par Dentist: UTD, pending dental clearance\par PCP: Dr. 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

## 2023-05-09 NOTE — HISTORY OF PRESENT ILLNESS
[FreeTextEntry1] : This is a 62 year old female who has a history of left breast cancer at age 50.  She was treated with a lumpectomy and sentinel node biopsy and required a re-excision for margins at [place].  She had a 1.4 cm tubular cancer with extensive DCIS, 6 negative nodes. She received radiation and took tamoxifen for 6 months.  She stopped due to side effects of leg pains and " not feeling well."\par \par She was seen for an initial visit on March 25. She had always had discomfort in the left breast, but also complained of intermittent right breast pain for a few months.  She was also bothered by the left breast deformity.\par \par I sent her for a breast MRI and there were two small abnormalities in the left breast.  Ultrasound guided core biopsies of both sites showed invasive ductal cancer.\par \par She underwent bilateral mastectomies with left sentinel node biopsy and ARNOLD reconstruction in conjunction with Dr. Henderson on 6/22/2022.  She had a Left breast 1.3 cm invasive ductal cancer, SBR 4/9, ER >95% AL 0 Her 2 2+ CISH nonamplified, Oncotype score 15. She had a revision and more recently liposuction on the right lateral breast.\par \par She is on Arishenex- Dr. Burton.

## 2023-06-19 ENCOUNTER — APPOINTMENT (OUTPATIENT)
Dept: PHYSICAL MEDICINE AND REHAB | Facility: CLINIC | Age: 63
End: 2023-06-19

## 2023-06-20 DIAGNOSIS — C50.412 MALIGNANT NEOPLASM OF UPPER-OUTER QUADRANT OF LEFT FEMALE BREAST: ICD-10-CM

## 2023-08-02 ENCOUNTER — OUTPATIENT (OUTPATIENT)
Dept: OUTPATIENT SERVICES | Facility: HOSPITAL | Age: 63
LOS: 1 days | Discharge: HOME | End: 2023-08-02

## 2023-08-02 DIAGNOSIS — Z98.51 TUBAL LIGATION STATUS: Chronic | ICD-10-CM

## 2023-08-02 DIAGNOSIS — Z98.89 OTHER SPECIFIED POSTPROCEDURAL STATES: Chronic | ICD-10-CM

## 2023-08-02 DIAGNOSIS — Z90.13 ACQUIRED ABSENCE OF BILATERAL BREASTS AND NIPPLES: Chronic | ICD-10-CM

## 2023-08-02 DIAGNOSIS — Z98.890 OTHER SPECIFIED POSTPROCEDURAL STATES: Chronic | ICD-10-CM

## 2023-08-02 DIAGNOSIS — C50.919 MALIGNANT NEOPLASM OF UNSPECIFIED SITE OF UNSPECIFIED FEMALE BREAST: ICD-10-CM

## 2023-08-08 ENCOUNTER — APPOINTMENT (OUTPATIENT)
Dept: HEMATOLOGY ONCOLOGY | Facility: CLINIC | Age: 63
End: 2023-08-08

## 2023-08-24 ENCOUNTER — NON-APPOINTMENT (OUTPATIENT)
Age: 63
End: 2023-08-24

## 2023-09-30 ENCOUNTER — OUTPATIENT (OUTPATIENT)
Dept: OUTPATIENT SERVICES | Facility: HOSPITAL | Age: 63
LOS: 1 days | Discharge: HOME | End: 2023-09-30

## 2023-09-30 DIAGNOSIS — Z90.13 ACQUIRED ABSENCE OF BILATERAL BREASTS AND NIPPLES: Chronic | ICD-10-CM

## 2023-09-30 DIAGNOSIS — Z98.51 TUBAL LIGATION STATUS: Chronic | ICD-10-CM

## 2023-09-30 DIAGNOSIS — C50.919 MALIGNANT NEOPLASM OF UNSPECIFIED SITE OF UNSPECIFIED FEMALE BREAST: ICD-10-CM

## 2023-09-30 DIAGNOSIS — Z98.890 OTHER SPECIFIED POSTPROCEDURAL STATES: Chronic | ICD-10-CM

## 2023-09-30 DIAGNOSIS — Z98.89 OTHER SPECIFIED POSTPROCEDURAL STATES: Chronic | ICD-10-CM

## 2023-10-01 PROBLEM — Z92.3 HISTORY OF RADIATION THERAPY: Status: RESOLVED | Noted: 2022-03-25 | Resolved: 2023-10-01

## 2023-10-03 ENCOUNTER — APPOINTMENT (OUTPATIENT)
Dept: HEMATOLOGY ONCOLOGY | Facility: CLINIC | Age: 63
End: 2023-10-03
Payer: COMMERCIAL

## 2023-10-03 VITALS
RESPIRATION RATE: 17 BRPM | TEMPERATURE: 98.2 F | SYSTOLIC BLOOD PRESSURE: 146 MMHG | OXYGEN SATURATION: 99 % | BODY MASS INDEX: 30.29 KG/M2 | DIASTOLIC BLOOD PRESSURE: 81 MMHG | WEIGHT: 171 LBS | HEART RATE: 77 BPM

## 2023-10-03 PROCEDURE — 99214 OFFICE O/P EST MOD 30 MIN: CPT

## 2023-10-04 DIAGNOSIS — C50.412 MALIGNANT NEOPLASM OF UPPER-OUTER QUADRANT OF LEFT FEMALE BREAST: ICD-10-CM

## 2023-10-13 ENCOUNTER — APPOINTMENT (OUTPATIENT)
Dept: BREAST CENTER | Facility: CLINIC | Age: 63
End: 2023-10-13
Payer: COMMERCIAL

## 2023-10-13 VITALS
BODY MASS INDEX: 29.59 KG/M2 | DIASTOLIC BLOOD PRESSURE: 87 MMHG | HEIGHT: 63 IN | WEIGHT: 167 LBS | SYSTOLIC BLOOD PRESSURE: 137 MMHG | HEART RATE: 74 BPM

## 2023-10-13 DIAGNOSIS — M79.621 PAIN IN RIGHT UPPER ARM: ICD-10-CM

## 2023-10-13 DIAGNOSIS — Z09 ENCOUNTER FOR FOLLOW-UP EXAMINATION AFTER COMPLETED TREATMENT FOR CONDITIONS OTHER THAN MALIGNANT NEOPLASM: ICD-10-CM

## 2023-10-13 DIAGNOSIS — Z85.3 PERSONAL HISTORY OF MALIGNANT NEOPLASM OF BREAST: ICD-10-CM

## 2023-10-13 DIAGNOSIS — Z01.818 ENCOUNTER FOR OTHER PREPROCEDURAL EXAMINATION: ICD-10-CM

## 2023-10-13 PROCEDURE — 99213 OFFICE O/P EST LOW 20 MIN: CPT

## 2023-10-13 RX ORDER — ROSUVASTATIN CALCIUM 5 MG/1
5 TABLET, FILM COATED ORAL
Refills: 0 | Status: ACTIVE | COMMUNITY

## 2023-10-13 RX ORDER — ANASTROZOLE TABLETS 1 MG/1
1 TABLET ORAL DAILY
Qty: 90 | Refills: 3 | Status: DISCONTINUED | COMMUNITY
Start: 2022-07-26 | End: 2023-10-13

## 2023-11-14 ENCOUNTER — APPOINTMENT (OUTPATIENT)
Dept: HEMATOLOGY ONCOLOGY | Facility: CLINIC | Age: 63
End: 2023-11-14

## 2023-12-13 ENCOUNTER — OUTPATIENT (OUTPATIENT)
Dept: OUTPATIENT SERVICES | Facility: HOSPITAL | Age: 63
LOS: 1 days | End: 2023-12-13
Payer: COMMERCIAL

## 2023-12-13 VITALS
WEIGHT: 167.99 LBS | SYSTOLIC BLOOD PRESSURE: 139 MMHG | HEIGHT: 63 IN | DIASTOLIC BLOOD PRESSURE: 90 MMHG | OXYGEN SATURATION: 99 % | RESPIRATION RATE: 18 BRPM | TEMPERATURE: 97 F | HEART RATE: 80 BPM

## 2023-12-13 DIAGNOSIS — Z85.3 PERSONAL HISTORY OF MALIGNANT NEOPLASM OF BREAST: ICD-10-CM

## 2023-12-13 DIAGNOSIS — Z90.13 ACQUIRED ABSENCE OF BILATERAL BREASTS AND NIPPLES: ICD-10-CM

## 2023-12-13 DIAGNOSIS — N65.0 DEFORMITY OF RECONSTRUCTED BREAST: ICD-10-CM

## 2023-12-13 DIAGNOSIS — Z98.890 OTHER SPECIFIED POSTPROCEDURAL STATES: Chronic | ICD-10-CM

## 2023-12-13 DIAGNOSIS — Z90.13 ACQUIRED ABSENCE OF BILATERAL BREASTS AND NIPPLES: Chronic | ICD-10-CM

## 2023-12-13 DIAGNOSIS — Z98.89 OTHER SPECIFIED POSTPROCEDURAL STATES: Chronic | ICD-10-CM

## 2023-12-13 DIAGNOSIS — Z98.51 TUBAL LIGATION STATUS: Chronic | ICD-10-CM

## 2023-12-13 DIAGNOSIS — Z01.818 ENCOUNTER FOR OTHER PREPROCEDURAL EXAMINATION: ICD-10-CM

## 2023-12-13 LAB
ANION GAP SERPL CALC-SCNC: 12 MMOL/L — SIGNIFICANT CHANGE UP (ref 5–17)
ANION GAP SERPL CALC-SCNC: 12 MMOL/L — SIGNIFICANT CHANGE UP (ref 5–17)
BUN SERPL-MCNC: 13 MG/DL — SIGNIFICANT CHANGE UP (ref 7–23)
BUN SERPL-MCNC: 13 MG/DL — SIGNIFICANT CHANGE UP (ref 7–23)
CALCIUM SERPL-MCNC: 9.9 MG/DL — SIGNIFICANT CHANGE UP (ref 8.4–10.5)
CALCIUM SERPL-MCNC: 9.9 MG/DL — SIGNIFICANT CHANGE UP (ref 8.4–10.5)
CHLORIDE SERPL-SCNC: 102 MMOL/L — SIGNIFICANT CHANGE UP (ref 96–108)
CHLORIDE SERPL-SCNC: 102 MMOL/L — SIGNIFICANT CHANGE UP (ref 96–108)
CO2 SERPL-SCNC: 27 MMOL/L — SIGNIFICANT CHANGE UP (ref 22–31)
CO2 SERPL-SCNC: 27 MMOL/L — SIGNIFICANT CHANGE UP (ref 22–31)
CREAT SERPL-MCNC: 0.56 MG/DL — SIGNIFICANT CHANGE UP (ref 0.5–1.3)
CREAT SERPL-MCNC: 0.56 MG/DL — SIGNIFICANT CHANGE UP (ref 0.5–1.3)
EGFR: 104 ML/MIN/1.73M2 — SIGNIFICANT CHANGE UP
EGFR: 104 ML/MIN/1.73M2 — SIGNIFICANT CHANGE UP
GLUCOSE SERPL-MCNC: 129 MG/DL — HIGH (ref 70–99)
GLUCOSE SERPL-MCNC: 129 MG/DL — HIGH (ref 70–99)
HCT VFR BLD CALC: 41.1 % — SIGNIFICANT CHANGE UP (ref 34.5–45)
HCT VFR BLD CALC: 41.1 % — SIGNIFICANT CHANGE UP (ref 34.5–45)
HGB BLD-MCNC: 13.7 G/DL — SIGNIFICANT CHANGE UP (ref 11.5–15.5)
HGB BLD-MCNC: 13.7 G/DL — SIGNIFICANT CHANGE UP (ref 11.5–15.5)
MCHC RBC-ENTMCNC: 28.7 PG — SIGNIFICANT CHANGE UP (ref 27–34)
MCHC RBC-ENTMCNC: 28.7 PG — SIGNIFICANT CHANGE UP (ref 27–34)
MCHC RBC-ENTMCNC: 33.3 GM/DL — SIGNIFICANT CHANGE UP (ref 32–36)
MCHC RBC-ENTMCNC: 33.3 GM/DL — SIGNIFICANT CHANGE UP (ref 32–36)
MCV RBC AUTO: 86.2 FL — SIGNIFICANT CHANGE UP (ref 80–100)
MCV RBC AUTO: 86.2 FL — SIGNIFICANT CHANGE UP (ref 80–100)
NRBC # BLD: 0 /100 WBCS — SIGNIFICANT CHANGE UP (ref 0–0)
NRBC # BLD: 0 /100 WBCS — SIGNIFICANT CHANGE UP (ref 0–0)
PLATELET # BLD AUTO: 318 K/UL — SIGNIFICANT CHANGE UP (ref 150–400)
PLATELET # BLD AUTO: 318 K/UL — SIGNIFICANT CHANGE UP (ref 150–400)
POTASSIUM SERPL-MCNC: 4 MMOL/L — SIGNIFICANT CHANGE UP (ref 3.5–5.3)
POTASSIUM SERPL-MCNC: 4 MMOL/L — SIGNIFICANT CHANGE UP (ref 3.5–5.3)
POTASSIUM SERPL-SCNC: 4 MMOL/L — SIGNIFICANT CHANGE UP (ref 3.5–5.3)
POTASSIUM SERPL-SCNC: 4 MMOL/L — SIGNIFICANT CHANGE UP (ref 3.5–5.3)
RBC # BLD: 4.77 M/UL — SIGNIFICANT CHANGE UP (ref 3.8–5.2)
RBC # BLD: 4.77 M/UL — SIGNIFICANT CHANGE UP (ref 3.8–5.2)
RBC # FLD: 13.2 % — SIGNIFICANT CHANGE UP (ref 10.3–14.5)
RBC # FLD: 13.2 % — SIGNIFICANT CHANGE UP (ref 10.3–14.5)
SODIUM SERPL-SCNC: 141 MMOL/L — SIGNIFICANT CHANGE UP (ref 135–145)
SODIUM SERPL-SCNC: 141 MMOL/L — SIGNIFICANT CHANGE UP (ref 135–145)
WBC # BLD: 4.43 K/UL — SIGNIFICANT CHANGE UP (ref 3.8–10.5)
WBC # BLD: 4.43 K/UL — SIGNIFICANT CHANGE UP (ref 3.8–10.5)
WBC # FLD AUTO: 4.43 K/UL — SIGNIFICANT CHANGE UP (ref 3.8–10.5)
WBC # FLD AUTO: 4.43 K/UL — SIGNIFICANT CHANGE UP (ref 3.8–10.5)

## 2023-12-13 PROCEDURE — 80048 BASIC METABOLIC PNL TOTAL CA: CPT

## 2023-12-13 PROCEDURE — 93005 ELECTROCARDIOGRAM TRACING: CPT

## 2023-12-13 PROCEDURE — 85027 COMPLETE CBC AUTOMATED: CPT

## 2023-12-13 PROCEDURE — 36415 COLL VENOUS BLD VENIPUNCTURE: CPT

## 2023-12-13 PROCEDURE — G0463: CPT

## 2023-12-13 PROCEDURE — 93010 ELECTROCARDIOGRAM REPORT: CPT | Mod: NC

## 2023-12-13 RX ORDER — CHOLECALCIFEROL (VITAMIN D3) 125 MCG
0 CAPSULE ORAL
Qty: 0 | Refills: 0 | DISCHARGE

## 2023-12-13 RX ORDER — ANASTROZOLE 1 MG/1
1 TABLET ORAL
Qty: 0 | Refills: 0 | DISCHARGE

## 2023-12-13 NOTE — H&P PST ADULT - NSICDXPASTSURGICALHX_GEN_ALL_CORE_FT
PAST SURGICAL HISTORY:  H/O bilateral mastectomy 6/2022    H/O tubal ligation age 32    History of breast reconstruction     S/P lumpectomy, left breast with lymph node dissection--7/2011     none

## 2023-12-13 NOTE — H&P PST ADULT - PROBLEM SELECTOR PLAN 1
Scheduled for revision of right breast reconstruction with Dr Henderson on 12/19/2023.  Pre op instructions given and patient verbalized understanding.  CBC, BMP, EKG pending.  NPO after midnight night before procedure.  To stop all ASA, NSAIDs, vitamins and supplements 1 week prior to procedure.  Chlorhexidene wash given with instructions.

## 2023-12-13 NOTE — H&P PST ADULT - HISTORY OF PRESENT ILLNESS
62 y/o female with PMH of breast cancers ( treated with RT, s/p bilateral mastectomy) and HLD presents for PST.  For follow up breast reconstruction after mastectomy with ARNOLD flap.  Patient states she is feeling well today at PST with no cough, fever, or acute illness.  Scheduled for revision of right breast reconstruction with Dr Henderson on 12/19/2023. 64 y/o female with PMH of breast cancers ( s/p RT, s/p bilateral mastectomy) and HLD presents for PST. C/o right axillary and reconstructed breast discomfort that was evaluated and recommended for upcoming procedure.  Patient states she is feeling well today at PST with no cough, fever, or acute illness.  Scheduled for revision of right breast reconstruction with Dr Henderson on 12/19/2023. 62 y/o female with PMH of breast cancers ( s/p RT, s/p bilateral mastectomy) and HLD presents for PST. C/o right axillary and reconstructed breast discomfort that was evaluated and recommended for upcoming procedure.  Patient states she is feeling well today at PST with no cough, fever, or acute illness.  Scheduled for revision of right breast reconstruction with Dr Henderson on 12/19/2023.

## 2023-12-13 NOTE — H&P PST ADULT - NSANTHOSAYNRD_GEN_A_CORE
neck 15 inches/No. FAM screening performed.  STOP BANG Legend: 0-2 = LOW Risk; 3-4 = INTERMEDIATE Risk; 5-8 = HIGH Risk

## 2023-12-18 ENCOUNTER — TRANSCRIPTION ENCOUNTER (OUTPATIENT)
Age: 63
End: 2023-12-18

## 2023-12-19 ENCOUNTER — OUTPATIENT (OUTPATIENT)
Dept: OUTPATIENT SERVICES | Facility: HOSPITAL | Age: 63
LOS: 1 days | End: 2023-12-19
Payer: COMMERCIAL

## 2023-12-19 ENCOUNTER — TRANSCRIPTION ENCOUNTER (OUTPATIENT)
Age: 63
End: 2023-12-19

## 2023-12-19 VITALS
HEIGHT: 63 IN | HEART RATE: 86 BPM | SYSTOLIC BLOOD PRESSURE: 111 MMHG | TEMPERATURE: 98 F | RESPIRATION RATE: 13 BRPM | OXYGEN SATURATION: 100 % | DIASTOLIC BLOOD PRESSURE: 76 MMHG | WEIGHT: 167.99 LBS

## 2023-12-19 VITALS — TEMPERATURE: 97 F

## 2023-12-19 DIAGNOSIS — Z90.13 ACQUIRED ABSENCE OF BILATERAL BREASTS AND NIPPLES: Chronic | ICD-10-CM

## 2023-12-19 DIAGNOSIS — Z98.51 TUBAL LIGATION STATUS: Chronic | ICD-10-CM

## 2023-12-19 DIAGNOSIS — Z90.13 ACQUIRED ABSENCE OF BILATERAL BREASTS AND NIPPLES: ICD-10-CM

## 2023-12-19 DIAGNOSIS — Z85.3 PERSONAL HISTORY OF MALIGNANT NEOPLASM OF BREAST: ICD-10-CM

## 2023-12-19 DIAGNOSIS — Z98.890 OTHER SPECIFIED POSTPROCEDURAL STATES: Chronic | ICD-10-CM

## 2023-12-19 DIAGNOSIS — N65.0 DEFORMITY OF RECONSTRUCTED BREAST: ICD-10-CM

## 2023-12-19 DIAGNOSIS — Z98.89 OTHER SPECIFIED POSTPROCEDURAL STATES: Chronic | ICD-10-CM

## 2023-12-19 PROCEDURE — 64912 NRV RPR W/NRV ALGRFT 1ST: CPT | Mod: RT

## 2023-12-19 PROCEDURE — 14001 TIS TRNFR TRUNK 10.1-30SQCM: CPT

## 2023-12-19 PROCEDURE — 15877 SUCTION LIPECTOMY TRUNK: CPT

## 2023-12-19 PROCEDURE — 88304 TISSUE EXAM BY PATHOLOGIST: CPT | Mod: 26

## 2023-12-19 PROCEDURE — 88304 TISSUE EXAM BY PATHOLOGIST: CPT

## 2023-12-19 PROCEDURE — C1763: CPT

## 2023-12-19 DEVICE — GRAFT NERVE CONNECTOR 3X15MM: Type: IMPLANTABLE DEVICE | Site: BILATERAL | Status: FUNCTIONAL

## 2023-12-19 RX ORDER — SODIUM CHLORIDE 9 MG/ML
1000 INJECTION, SOLUTION INTRAVENOUS
Refills: 0 | Status: DISCONTINUED | OUTPATIENT
Start: 2023-12-19 | End: 2023-12-19

## 2023-12-19 RX ORDER — ONDANSETRON 8 MG/1
4 TABLET, FILM COATED ORAL ONCE
Refills: 0 | Status: DISCONTINUED | OUTPATIENT
Start: 2023-12-19 | End: 2023-12-19

## 2023-12-19 RX ORDER — ROSUVASTATIN CALCIUM 5 MG/1
1.5 TABLET ORAL
Refills: 0 | DISCHARGE

## 2023-12-19 RX ORDER — HYDROMORPHONE HYDROCHLORIDE 2 MG/ML
0.5 INJECTION INTRAMUSCULAR; INTRAVENOUS; SUBCUTANEOUS
Refills: 0 | Status: DISCONTINUED | OUTPATIENT
Start: 2023-12-19 | End: 2023-12-19

## 2023-12-19 RX ORDER — ANASTROZOLE 1 MG/1
1 TABLET ORAL
Refills: 0 | DISCHARGE

## 2023-12-19 RX ORDER — HYDROMORPHONE HYDROCHLORIDE 2 MG/ML
1 INJECTION INTRAMUSCULAR; INTRAVENOUS; SUBCUTANEOUS
Refills: 0 | Status: DISCONTINUED | OUTPATIENT
Start: 2023-12-19 | End: 2023-12-19

## 2023-12-19 RX ADMIN — HYDROMORPHONE HYDROCHLORIDE 1 MILLIGRAM(S): 2 INJECTION INTRAMUSCULAR; INTRAVENOUS; SUBCUTANEOUS at 12:27

## 2023-12-19 RX ADMIN — HYDROMORPHONE HYDROCHLORIDE 1 MILLIGRAM(S): 2 INJECTION INTRAMUSCULAR; INTRAVENOUS; SUBCUTANEOUS at 12:33

## 2023-12-19 RX ADMIN — SODIUM CHLORIDE 50 MILLILITER(S): 9 INJECTION, SOLUTION INTRAVENOUS at 08:38

## 2023-12-19 NOTE — ASU PREOP CHECKLIST - 1.
-- DO NOT REPLY / DO NOT REPLY ALL --  -- Message is from the [facility]--    Order Request  Procedure: Colonoscopy     Message / reason: Due for one     Insurance type:   Payor: [employer] / Plan: CYNTHIA HERNANDEZ [id number] / Product Type: [employer]    Preferred Delivery Method   Call when ready for pickup - phone number to notify: [phone]     Caller Information       Type Contact Phone    10/21/2021 11:17 AM CDT Phone (Incoming) Moira Hay (Self) [phone] ()          Alternative phone number: n/a     Turnaround time given to caller:   \"This message will be sent to [state Provider's name]. The clinical team will fulfill your request as soon as they review your message.\"  
I had placed order for GI consultation. Dr. العراقي. Did she already see GI and scheduled Cscope? Need more info.   
Spoke to patient and stated no. She has not seen GI and call center told her there was no referral/orders. I apologized for that.    She will call GI to schedule an appointment.   
patient oriented to unit and procedure

## 2023-12-19 NOTE — BRIEF OPERATIVE NOTE - NSICDXBRIEFPOSTOP_GEN_ALL_CORE_FT
POST-OP DIAGNOSIS:  History of breast cancer 19-Dec-2023 12:06:50  Lianet Villegas  Neuroma 19-Dec-2023 12:07:15  Lianet Villegas  Acquired contour deformity of breast 19-Dec-2023 12:07:31  Lianet Villegas   POST-OP DIAGNOSIS:  History of breast cancer 19-Dec-2023 12:06:50  Lainet Villegas  Neuroma 19-Dec-2023 12:07:15  Lianet Villegas  Acquired contour deformity of breast 19-Dec-2023 12:07:31  Lianet Villegas

## 2023-12-19 NOTE — ASU DISCHARGE PLAN (ADULT/PEDIATRIC) - PROVIDER TOKENS
PROVIDER:[TOKEN:[[id number]],SCHEDULEDAPPT:[12/27/2023],ESTABLISHEDPATIENT:[T]] PROVIDER:[TOKEN:[[id number]],SCHEDULEDAPPT:[12/27/2023],ESTABLISHEDPATIENT:[T]]

## 2023-12-19 NOTE — ASU DISCHARGE PLAN (ADULT/PEDIATRIC) - FOLLOW UP APPOINTMENTS
[facility], Ambulatory Surgery Unit [facility], Ambulatory Surgery Unit [phone]

## 2023-12-19 NOTE — BRIEF OPERATIVE NOTE - NSICDXBRIEFPROCEDURE_GEN_ALL_CORE_FT
PROCEDURES:  Procedure, nerve graft, cable 19-Dec-2023 12:05:35  Lianet Villegas  Revision, reconstruction, breast 19-Dec-2023 12:05:47  Lianet Villegas

## 2023-12-19 NOTE — ASU DISCHARGE PLAN (ADULT/PEDIATRIC) - NS MD DC FALL RISK RISK
For information on Fall & Injury Prevention, visit: [email or web address] OR  [email or web address] OR  https://www.cdc.gov/steadi/patient.html For information on Fall & Injury Prevention, visit: [email or web address]://[email or web address]/[email or web address]-mobility OR  [email or web address]://www.[facility]/[email or web address]-tips-to-avoid-injury OR  https://www.cdc.gov/steadi/patient.html

## 2023-12-19 NOTE — BRIEF OPERATIVE NOTE - NSICDXBRIEFPREOP_GEN_ALL_CORE_FT
PRE-OP DIAGNOSIS:  History of breast cancer 19-Dec-2023 12:05:57  Lianet Villegas  Acquired contour deformity of breast 19-Dec-2023 12:06:31  Lianet Villegas

## 2023-12-22 ENCOUNTER — OUTPATIENT (OUTPATIENT)
Dept: OUTPATIENT SERVICES | Facility: HOSPITAL | Age: 63
LOS: 1 days | Discharge: HOME | End: 2023-12-22

## 2023-12-22 DIAGNOSIS — Z98.890 OTHER SPECIFIED POSTPROCEDURAL STATES: Chronic | ICD-10-CM

## 2023-12-22 DIAGNOSIS — C50.919 MALIGNANT NEOPLASM OF UNSPECIFIED SITE OF UNSPECIFIED FEMALE BREAST: ICD-10-CM

## 2023-12-22 DIAGNOSIS — Z98.51 TUBAL LIGATION STATUS: Chronic | ICD-10-CM

## 2023-12-22 DIAGNOSIS — Z98.89 OTHER SPECIFIED POSTPROCEDURAL STATES: Chronic | ICD-10-CM

## 2023-12-22 DIAGNOSIS — Z90.13 ACQUIRED ABSENCE OF BILATERAL BREASTS AND NIPPLES: Chronic | ICD-10-CM

## 2023-12-26 ENCOUNTER — APPOINTMENT (OUTPATIENT)
Dept: HEMATOLOGY ONCOLOGY | Facility: CLINIC | Age: 63
End: 2023-12-26
Payer: COMMERCIAL

## 2023-12-26 ENCOUNTER — APPOINTMENT (OUTPATIENT)
Dept: HEMATOLOGY ONCOLOGY | Facility: CLINIC | Age: 63
End: 2023-12-26

## 2023-12-26 DIAGNOSIS — M85.80 OTHER SPECIFIED DISORDERS OF BONE DENSITY AND STRUCTURE, UNSPECIFIED SITE: ICD-10-CM

## 2023-12-26 PROCEDURE — 99443: CPT

## 2023-12-26 RX ORDER — EXEMESTANE 25 MG/1
25 TABLET, FILM COATED ORAL DAILY
Qty: 30 | Refills: 5 | Status: DISCONTINUED | COMMUNITY
Start: 2023-10-03 | End: 2023-12-26

## 2023-12-27 LAB
SURGICAL PATHOLOGY STUDY: SIGNIFICANT CHANGE UP
SURGICAL PATHOLOGY STUDY: SIGNIFICANT CHANGE UP

## 2023-12-29 NOTE — PHYSICAL EXAM
[Fully active, able to carry on all pre-disease performance without restriction] : Status 0 - Fully active, able to carry on all pre-disease performance without restriction [Normal] : well developed, well nourished, in no acute distress [de-identified] : Pleasant cooperative female in NAD

## 2023-12-29 NOTE — HISTORY OF PRESENT ILLNESS
[Home] : at home, [unfilled] , at the time of the visit. [Medical Office: ([facility])___] : at the medical office located in  [Verbal consent obtained from patient] : the patient, [unfilled] [de-identified] : Referred by: Dr. Drake  Breast Cancer Summary:  DIAGNOSIS:  L breast IDC, multifocal  PROCEDURE AND DATE: Bilateral mastectomy 22 PATHOLOGY:  L breast revealed IDC, well differentiated, measuring 1.3cm, focal DCIS, LVI negative, 0/2. R breast benign.  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 Berg is a post-menopausal female who initially presented at age 62 in 2022 for evaluation of L breast cancer, multifocal IDC, ER+.  The patient has a medical history of L breast cancer (age 50), s/p lumpectomy/SLNB, XRT/tamoxifen x 6 months.   Florencia presented for evaluation after recently diagnosed L breast cancer. The patient had a history of L breast cancer at age 50. She was  treated with a lumpectomy and sentinel node biopsy and required a re-excision for margins at [place]. She had a 1.4 cm tubular cancer with extensive DCIS, 6 negative nodes. She received radiation and took tamoxifen for 6 months, discontinued herself 2/2 side effects and she did not follow up with medical oncology. She then presented in 2022 with bilateral breast discomfort. Breast MRI on 22 revealed two enhancing masses in the L breast, measuring 6mm and 5mm located 6mm from each other, no axillary or internal mammary LAD. She underwent L breast biopsy on 22 which revealed 2 areas of IDC, well differentiated, DCIS, LVI negative, ER 95%, CO 0%, Her 2 2+, FISH negative. PET/CT 22 was negative for metastatic disease. She underwent bilateral mastectomy on 22 which revealed benign changes in the R breast, R SLNB negative 0/3, L breast revealed IDC, well differentiated, measuring 1.3cm, focal DCIS, LVI negative, 0/2.   Her oncotype score has resulted as 15 with 4% risk of distant recurrence at 9 years and <1% absolute chemotherapy benefit.  She was feeling well and healing from surgery. Had scant discharge from her belly button. No fevers or chills. She denied headaches, back pain, weight loss.   Imaging:  Breast MRI: 22 two enhancing masses in the L breast, measuring 6mm and 5mm located 6mm from each other, no axillary or internal mammary LAD.  PET/CT 22: Nonspecific minimally FDG avid ill-defined soft tissue density adjacent to the clip, central left breast with SUV 1.7, small minimally FDG avid right axillary lymph node, probably benign, no evidence of metastatic disease.  Path:  Breast biopsy 22: two foci of IDC well differentiated, measuring 4mm and 5mm, DCIS+, LVI negative, ER 95%+, CO 0%, Her 2 2+ FISH NEG  Bilateral mastectomy 22 which revealed benign changes in the R breast, R SLNB negative 0/3, L breast revealed IDC, well differentiated, measuring 1.3cm, focal DCIS, LVI negative, 0/2.   HCM:  - COVID vaccination: s/p J&J and 1 booster  - Colonoscopy: last colonoscopy 5 years ago (2017)- normal  - Gyn: pap 22 negative  - Mammo: annually   SH:  - Occupation: works as an assistant  - Living situation: [place], lives alone, she has 2 grown children  - Smoking/etoh/illicits: former smoker, quit 40 years ago  - Exercise: [place], walking   OB/GYN Hx:  - Age of menarche: 11 - Age of menopause: 50 - Pregnancy history: , 43 and 38, grandchildren are 20, 9 and 2  - Age at live birth: 21 - OCP use: brief use  - Hormonal therapy: n/a - Breast feeding history: 9 months   FH:  - No family history of any cancers  [de-identified] : Florencia participated in a follow up tele-health visit on 10/3/23 for L breast cancer, multifocal, on AI.  Initiated on arimidex 7/27/22.   At today's visit she reports feeling generally well.  She held Arimidex in 9/2023 2/2 severe joint pains. Was subsequently found to have tendonitis and carpal tunnel syndrome, awaiting surgery on 1/4/23.  She elected to resume Arimidex without worsening arthralgias (elected not to start exemestane as previously discussed).  She denies hot flashes or vaginal dryness at this time. She had dental clearance in July 2023. Pending repeat DEXA in January 2024. She is taking calcium and vitamin D. She denies fevers, chills, CP, SOB, n/v/d, LE swelling.   Health Care Maintenance: Updated 12/29/23  Mammogram: Bilateral mastectomy 6/22/22 Colonoscopy:  last colonoscopy 5 years ago (2017)- DUE  Pap smear: Dr. Jones, June 2023- normal  Dentist: [facility], dental clearance July 2023   PCP: Dr. 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

## 2023-12-29 NOTE — RESULTS/DATA
[FreeTextEntry1] : Ms. Berg is a post-menopausal female who presented at age 62 in July 2022 for evaluation of L breast cancer, multifocal IDC, ER+.  The patient has a medical history of L breast cancer (age 50), s/p lumpectomy/SLNB, XRT/tamoxifen x 6 months.   Stage 1A L breast multifocal IDC, ER+, lymph node negative.  We discussed the excellent prognosis of stage I, ER positive, node negative breast cancer.  Her oncotype score has resulted as 15 with 4% risk of distant recurrence at 9 years and <1% absolute chemotherapy benefit. No role for chemotherapy, arimidex started 7/27/22.  Continues Arimidex w/ improved tolerance Arthralgias stable - found to have tendonitis/carpal tunnel syndrome, surgery pending Elected not to switch to exemestane as discussed at last visit Did not tolerate tamoxifen in the past.  DEXA due 1/2024- may require zometa. Will mail script to repeat. Follow up w/ Dr. Burton in 3-4 months, sooner prn.  I personally have spent a total of 35 minutes of time on the date of this encounter reviewing test results, documenting findings, coordinating care and directly consulting with the patient and/or designated family member. Greater than 50% of the face to face encounter time was spent on counseling and/or coordination of care for breast cancer.

## 2024-01-23 NOTE — PATIENT PROFILE ADULT - FALL HARM RISK - PATIENT NEEDS ASSISTANCE
SW will meet with pt to provide psychoed and support towards pt's goal. SW will identify and create appropriate referrals for follow-up care. SW meet with multidisciplinary team daily for updates on pt's goal progress. No assistance needed

## 2024-04-12 ENCOUNTER — APPOINTMENT (OUTPATIENT)
Dept: RADIOLOGY | Facility: CLINIC | Age: 64
End: 2024-04-12
Payer: COMMERCIAL

## 2024-04-12 ENCOUNTER — OUTPATIENT (OUTPATIENT)
Dept: OUTPATIENT SERVICES | Facility: HOSPITAL | Age: 64
LOS: 1 days | End: 2024-04-12
Payer: COMMERCIAL

## 2024-04-12 ENCOUNTER — APPOINTMENT (OUTPATIENT)
Dept: BREAST CENTER | Facility: CLINIC | Age: 64
End: 2024-04-12
Payer: COMMERCIAL

## 2024-04-12 VITALS
BODY MASS INDEX: 28.88 KG/M2 | HEIGHT: 63 IN | DIASTOLIC BLOOD PRESSURE: 102 MMHG | HEART RATE: 82 BPM | WEIGHT: 163 LBS | SYSTOLIC BLOOD PRESSURE: 152 MMHG

## 2024-04-12 DIAGNOSIS — Z98.890 OTHER SPECIFIED POSTPROCEDURAL STATES: Chronic | ICD-10-CM

## 2024-04-12 DIAGNOSIS — M85.80 OTHER SPECIFIED DISORDERS OF BONE DENSITY AND STRUCTURE, UNSPECIFIED SITE: ICD-10-CM

## 2024-04-12 PROCEDURE — 99213 OFFICE O/P EST LOW 20 MIN: CPT

## 2024-04-12 PROCEDURE — 77080 DXA BONE DENSITY AXIAL: CPT | Mod: 26

## 2024-04-12 PROCEDURE — 77080 DXA BONE DENSITY AXIAL: CPT

## 2024-04-12 NOTE — HISTORY OF PRESENT ILLNESS
[FreeTextEntry1] : This is a 63 year old female who has a history of left breast cancer at age 50.  She was treated with a lumpectomy and sentinel node biopsy and required a re-excision for margins at [place].  She had a 1.4 cm tubular cancer with extensive DCIS, 6 negative nodes. She received radiation and took tamoxifen for 6 months.  She stopped due to side effects of leg pains and " not feeling well."  She was seen for an initial visit on March 25. She had always had discomfort in the left breast, but also complained of intermittent right breast pain for a few months.  She was also bothered by the left breast deformity.  I sent her for a breast MRI and there were two small abnormalities in the left breast.  Ultrasound guided core biopsies of both sites showed invasive ductal cancer.  She underwent bilateral mastectomies with left sentinel node biopsy and ARNOLD reconstruction in conjunction with Dr. Henderson on 6/22/2022.  She had a Left breast 1.3 cm invasive ductal cancer, SBR 4/9, ER >95% UT 0 Her 2 2+ CISH nonamplified, Oncotype score 15. She had a revision and with liposuction on the right lateral breast.  She is on Arimidex- Dr. Burton.  She had revision surgery with liposuction of the outer breasts.  She still has discomfort on the sides where her arms rub against the breasts and she has a follow-up with Dr. Henderson.

## 2024-04-12 NOTE — DATA REVIEWED
[FreeTextEntry1] : Pathology 4/25/2022:  Left breast 3N3= invasive well diff ductal carcinoma, SBR 5/9 4 mm (heart clip)\par                                      Left 3 N4= invasive well diff ductal carcinoma, SBR 5/9, DCIS, ER >95% MI 0, Her 2 2+ CISH nonamplified. (wing clip)\par  \par  Surgical pathology 6/22/2022:  Right breast= radial scars, fibrocystic, intramammary node and SLN benign\par                                                    Left breast= 1.3 cm invasive ductal carcinoma, SBR 4/9 with focal DCIS, SLN neg

## 2024-04-12 NOTE — PHYSICAL EXAM
[EOMI] : extra ocular movement intact [Sclera nonicteric] : sclera nonicteric [Supple] : supple [No Supraclavicular Adenopathy] : no supraclavicular adenopathy [No Cervical Adenopathy] : no cervical adenopathy [No Thyromegaly] : no thyromegaly [Clear to Auscultation Bilat] : clear to auscultation bilaterally [Examined in the supine and seated position] : examined in the supine and seated position [No dominant masses] : no dominant masses in right breast  [No dominant masses] : no dominant masses left breast [No Axillary Lymphadenopathy] : no left axillary lymphadenopathy [de-identified] : Circular skin island  with nipple reconstruction and tattoo.    [de-identified] : Circular skin island with nipple reconstruction and tattoo. [de-identified] : Lower transverse scar.

## 2024-04-15 ENCOUNTER — NON-APPOINTMENT (OUTPATIENT)
Age: 64
End: 2024-04-15

## 2024-04-18 ENCOUNTER — OUTPATIENT (OUTPATIENT)
Dept: OUTPATIENT SERVICES | Facility: HOSPITAL | Age: 64
LOS: 1 days | Discharge: HOME | End: 2024-04-18

## 2024-04-18 DIAGNOSIS — Z90.13 ACQUIRED ABSENCE OF BILATERAL BREASTS AND NIPPLES: Chronic | ICD-10-CM

## 2024-04-18 DIAGNOSIS — C50.919 MALIGNANT NEOPLASM OF UNSPECIFIED SITE OF UNSPECIFIED FEMALE BREAST: ICD-10-CM

## 2024-04-18 DIAGNOSIS — Z98.51 TUBAL LIGATION STATUS: Chronic | ICD-10-CM

## 2024-04-18 DIAGNOSIS — Z98.89 OTHER SPECIFIED POSTPROCEDURAL STATES: Chronic | ICD-10-CM

## 2024-04-18 DIAGNOSIS — Z98.890 OTHER SPECIFIED POSTPROCEDURAL STATES: Chronic | ICD-10-CM

## 2024-04-19 ENCOUNTER — APPOINTMENT (OUTPATIENT)
Dept: HEMATOLOGY ONCOLOGY | Facility: CLINIC | Age: 64
End: 2024-04-19
Payer: COMMERCIAL

## 2024-04-19 VITALS
WEIGHT: 167 LBS | SYSTOLIC BLOOD PRESSURE: 143 MMHG | HEIGHT: 63 IN | OXYGEN SATURATION: 99 % | HEART RATE: 68 BPM | BODY MASS INDEX: 29.59 KG/M2 | DIASTOLIC BLOOD PRESSURE: 88 MMHG | TEMPERATURE: 97.9 F

## 2024-04-19 DIAGNOSIS — C50.412 MALIGNANT NEOPLASM OF UPPER-OUTER QUADRANT OF LEFT FEMALE BREAST: ICD-10-CM

## 2024-04-19 DIAGNOSIS — Z17.0 MALIGNANT NEOPLASM OF UPPER-OUTER QUADRANT OF LEFT FEMALE BREAST: ICD-10-CM

## 2024-04-19 PROCEDURE — G2211 COMPLEX E/M VISIT ADD ON: CPT

## 2024-04-19 PROCEDURE — 99214 OFFICE O/P EST MOD 30 MIN: CPT

## 2024-06-14 ENCOUNTER — APPOINTMENT (OUTPATIENT)
Dept: HEMATOLOGY ONCOLOGY | Facility: CLINIC | Age: 64
End: 2024-06-14

## 2024-10-01 NOTE — H&P PST ADULT - NS PRO FEM  PAP SMEARS 3YRS
[employer] Alert Outreach Telephone Call Attempt     Patient is being outreached by the Care Transitions Program for a clinical alert from the [employer] monitoring program.     Call Attempt Date: 10/1/2024    Call Attempt: First Attempt    Left message to callback.    yes

## 2024-10-06 ENCOUNTER — OUTPATIENT (OUTPATIENT)
Dept: OUTPATIENT SERVICES | Facility: HOSPITAL | Age: 64
LOS: 1 days | Discharge: HOME | End: 2024-10-06

## 2024-10-06 DIAGNOSIS — Z98.890 OTHER SPECIFIED POSTPROCEDURAL STATES: Chronic | ICD-10-CM

## 2024-10-06 DIAGNOSIS — Z98.51 TUBAL LIGATION STATUS: Chronic | ICD-10-CM

## 2024-10-06 DIAGNOSIS — C50.919 MALIGNANT NEOPLASM OF UNSPECIFIED SITE OF UNSPECIFIED FEMALE BREAST: ICD-10-CM

## 2024-10-06 DIAGNOSIS — Z90.13 ACQUIRED ABSENCE OF BILATERAL BREASTS AND NIPPLES: Chronic | ICD-10-CM

## 2024-10-06 DIAGNOSIS — Z98.89 OTHER SPECIFIED POSTPROCEDURAL STATES: Chronic | ICD-10-CM

## 2024-10-11 ENCOUNTER — RESULT REVIEW (OUTPATIENT)
Age: 64
End: 2024-10-11

## 2024-10-11 ENCOUNTER — APPOINTMENT (OUTPATIENT)
Dept: BREAST CENTER | Facility: CLINIC | Age: 64
End: 2024-10-11
Payer: COMMERCIAL

## 2024-10-11 ENCOUNTER — APPOINTMENT (OUTPATIENT)
Dept: HEMATOLOGY ONCOLOGY | Facility: CLINIC | Age: 64
End: 2024-10-11
Payer: COMMERCIAL

## 2024-10-11 ENCOUNTER — NON-APPOINTMENT (OUTPATIENT)
Age: 64
End: 2024-10-11

## 2024-10-11 VITALS
HEIGHT: 63 IN | WEIGHT: 168 LBS | OXYGEN SATURATION: 93 % | HEART RATE: 93 BPM | SYSTOLIC BLOOD PRESSURE: 103 MMHG | TEMPERATURE: 98.4 F | BODY MASS INDEX: 29.77 KG/M2 | DIASTOLIC BLOOD PRESSURE: 70 MMHG

## 2024-10-11 VITALS
HEIGHT: 63 IN | WEIGHT: 163 LBS | DIASTOLIC BLOOD PRESSURE: 79 MMHG | SYSTOLIC BLOOD PRESSURE: 114 MMHG | HEART RATE: 88 BPM | BODY MASS INDEX: 28.88 KG/M2

## 2024-10-11 DIAGNOSIS — Z85.3 PERSONAL HISTORY OF MALIGNANT NEOPLASM OF BREAST: ICD-10-CM

## 2024-10-11 DIAGNOSIS — Z17.0 MALIGNANT NEOPLASM OF UPPER-OUTER QUADRANT OF LEFT FEMALE BREAST: ICD-10-CM

## 2024-10-11 DIAGNOSIS — C50.412 MALIGNANT NEOPLASM OF UPPER-OUTER QUADRANT OF LEFT FEMALE BREAST: ICD-10-CM

## 2024-10-11 LAB
BASOPHILS # BLD AUTO: 0.03 K/UL — SIGNIFICANT CHANGE UP (ref 0–0.2)
BASOPHILS NFR BLD AUTO: 0.5 % — SIGNIFICANT CHANGE UP (ref 0–2)
EOSINOPHIL # BLD AUTO: 0.06 K/UL — SIGNIFICANT CHANGE UP (ref 0–0.5)
EOSINOPHIL NFR BLD AUTO: 1 % — SIGNIFICANT CHANGE UP (ref 0–6)
HCT VFR BLD CALC: 39.8 % — SIGNIFICANT CHANGE UP (ref 34.5–45)
HGB BLD-MCNC: 13.3 G/DL — SIGNIFICANT CHANGE UP (ref 11.5–15.5)
IMM GRANULOCYTES NFR BLD AUTO: 0.6 % — SIGNIFICANT CHANGE UP (ref 0–0.9)
LYMPHOCYTES # BLD AUTO: 2.19 K/UL — SIGNIFICANT CHANGE UP (ref 1–3.3)
LYMPHOCYTES # BLD AUTO: 34.9 % — SIGNIFICANT CHANGE UP (ref 13–44)
MCHC RBC-ENTMCNC: 28.8 PG — SIGNIFICANT CHANGE UP (ref 27–34)
MCHC RBC-ENTMCNC: 33.4 GM/DL — SIGNIFICANT CHANGE UP (ref 32–36)
MCV RBC AUTO: 86.1 FL — SIGNIFICANT CHANGE UP (ref 80–100)
MONOCYTES # BLD AUTO: 0.38 K/UL — SIGNIFICANT CHANGE UP (ref 0–0.9)
MONOCYTES NFR BLD AUTO: 6.1 % — SIGNIFICANT CHANGE UP (ref 2–14)
NEUTROPHILS # BLD AUTO: 3.57 K/UL — SIGNIFICANT CHANGE UP (ref 1.8–7.4)
NEUTROPHILS NFR BLD AUTO: 56.9 % — SIGNIFICANT CHANGE UP (ref 43–77)
NRBC # BLD: 0 /100 WBCS — SIGNIFICANT CHANGE UP (ref 0–0)
NRBC BLD-RTO: 0 /100 WBCS — SIGNIFICANT CHANGE UP (ref 0–0)
PLATELET # BLD AUTO: 337 K/UL — SIGNIFICANT CHANGE UP (ref 150–400)
RBC # BLD: 4.62 M/UL — SIGNIFICANT CHANGE UP (ref 3.8–5.2)
RBC # FLD: 13.2 % — SIGNIFICANT CHANGE UP (ref 10.3–14.5)
WBC # BLD: 6.27 K/UL — SIGNIFICANT CHANGE UP (ref 3.8–10.5)
WBC # FLD AUTO: 6.27 K/UL — SIGNIFICANT CHANGE UP (ref 3.8–10.5)

## 2024-10-11 PROCEDURE — G2211 COMPLEX E/M VISIT ADD ON: CPT

## 2024-10-11 PROCEDURE — 99213 OFFICE O/P EST LOW 20 MIN: CPT

## 2024-10-11 PROCEDURE — 99214 OFFICE O/P EST MOD 30 MIN: CPT

## 2024-10-13 LAB
25(OH)D3 SERPL-MCNC: 37.1 NG/ML
ALBUMIN SERPL ELPH-MCNC: 4.8 G/DL
ALP BLD-CCNC: 148 U/L
ALT SERPL-CCNC: 15 U/L
ANION GAP SERPL CALC-SCNC: 13 MMOL/L
AST SERPL-CCNC: 18 U/L
BILIRUB SERPL-MCNC: 0.2 MG/DL
BUN SERPL-MCNC: 10 MG/DL
CALCIUM SERPL-MCNC: 10.1 MG/DL
CHLORIDE SERPL-SCNC: 102 MMOL/L
CO2 SERPL-SCNC: 26 MMOL/L
CREAT SERPL-MCNC: 0.56 MG/DL
EGFR: 103 ML/MIN/1.73M2
FERRITIN SERPL-MCNC: 59 NG/ML
FOLATE SERPL-MCNC: >20 NG/ML
GLUCOSE SERPL-MCNC: 133 MG/DL
POTASSIUM SERPL-SCNC: 4.3 MMOL/L
PROT SERPL-MCNC: 7.3 G/DL
SODIUM SERPL-SCNC: 141 MMOL/L
VIT B12 SERPL-MCNC: 489 PG/ML

## 2025-04-18 ENCOUNTER — NON-APPOINTMENT (OUTPATIENT)
Age: 65
End: 2025-04-18

## 2025-04-18 ENCOUNTER — APPOINTMENT (OUTPATIENT)
Dept: BREAST CENTER | Facility: CLINIC | Age: 65
End: 2025-04-18
Payer: COMMERCIAL

## 2025-04-18 VITALS
DIASTOLIC BLOOD PRESSURE: 88 MMHG | HEART RATE: 67 BPM | BODY MASS INDEX: 28.35 KG/M2 | SYSTOLIC BLOOD PRESSURE: 139 MMHG | HEIGHT: 63 IN | WEIGHT: 160 LBS

## 2025-04-18 DIAGNOSIS — C50.412 MALIGNANT NEOPLASM OF UPPER-OUTER QUADRANT OF LEFT FEMALE BREAST: ICD-10-CM

## 2025-04-18 DIAGNOSIS — Z17.0 MALIGNANT NEOPLASM OF UPPER-OUTER QUADRANT OF LEFT FEMALE BREAST: ICD-10-CM

## 2025-04-18 PROCEDURE — 99212 OFFICE O/P EST SF 10 MIN: CPT

## 2025-04-29 ENCOUNTER — OUTPATIENT (OUTPATIENT)
Dept: OUTPATIENT SERVICES | Facility: HOSPITAL | Age: 65
LOS: 1 days | Discharge: HOME | End: 2025-04-29

## 2025-04-29 DIAGNOSIS — Z90.13 ACQUIRED ABSENCE OF BILATERAL BREASTS AND NIPPLES: Chronic | ICD-10-CM

## 2025-04-29 DIAGNOSIS — Z98.51 TUBAL LIGATION STATUS: Chronic | ICD-10-CM

## 2025-04-29 DIAGNOSIS — Z98.89 OTHER SPECIFIED POSTPROCEDURAL STATES: Chronic | ICD-10-CM

## 2025-04-29 DIAGNOSIS — C50.919 MALIGNANT NEOPLASM OF UNSPECIFIED SITE OF UNSPECIFIED FEMALE BREAST: ICD-10-CM

## 2025-04-29 DIAGNOSIS — Z98.890 OTHER SPECIFIED POSTPROCEDURAL STATES: Chronic | ICD-10-CM

## 2025-06-24 ENCOUNTER — APPOINTMENT (OUTPATIENT)
Dept: ULTRASOUND IMAGING | Facility: CLINIC | Age: 65
End: 2025-06-24
Payer: COMMERCIAL

## 2025-06-24 ENCOUNTER — APPOINTMENT (OUTPATIENT)
Dept: HEMATOLOGY ONCOLOGY | Facility: CLINIC | Age: 65
End: 2025-06-24
Payer: COMMERCIAL

## 2025-06-24 ENCOUNTER — RESULT REVIEW (OUTPATIENT)
Age: 65
End: 2025-06-24

## 2025-06-24 ENCOUNTER — OUTPATIENT (OUTPATIENT)
Dept: OUTPATIENT SERVICES | Facility: HOSPITAL | Age: 65
LOS: 1 days | End: 2025-06-24
Payer: COMMERCIAL

## 2025-06-24 VITALS
SYSTOLIC BLOOD PRESSURE: 147 MMHG | BODY MASS INDEX: 29.06 KG/M2 | OXYGEN SATURATION: 100 % | HEART RATE: 71 BPM | TEMPERATURE: 98.3 F | DIASTOLIC BLOOD PRESSURE: 93 MMHG | WEIGHT: 164 LBS | HEIGHT: 63 IN

## 2025-06-24 DIAGNOSIS — Z98.890 OTHER SPECIFIED POSTPROCEDURAL STATES: Chronic | ICD-10-CM

## 2025-06-24 DIAGNOSIS — Z98.89 OTHER SPECIFIED POSTPROCEDURAL STATES: Chronic | ICD-10-CM

## 2025-06-24 DIAGNOSIS — C50.412 MALIGNANT NEOPLASM OF UPPER-OUTER QUADRANT OF LEFT FEMALE BREAST: ICD-10-CM

## 2025-06-24 DIAGNOSIS — Z98.51 TUBAL LIGATION STATUS: Chronic | ICD-10-CM

## 2025-06-24 DIAGNOSIS — Z90.13 ACQUIRED ABSENCE OF BILATERAL BREASTS AND NIPPLES: Chronic | ICD-10-CM

## 2025-06-24 DIAGNOSIS — M79.89 OTHER SPECIFIED SOFT TISSUE DISORDERS: ICD-10-CM

## 2025-06-24 LAB
BASOPHILS # BLD AUTO: 0.04 K/UL — SIGNIFICANT CHANGE UP (ref 0–0.2)
BASOPHILS NFR BLD AUTO: 0.8 % — SIGNIFICANT CHANGE UP (ref 0–2)
EOSINOPHIL # BLD AUTO: 0.07 K/UL — SIGNIFICANT CHANGE UP (ref 0–0.5)
EOSINOPHIL NFR BLD AUTO: 1.4 % — SIGNIFICANT CHANGE UP (ref 0–6)
HCT VFR BLD CALC: 41.9 % — SIGNIFICANT CHANGE UP (ref 34.5–45)
HGB BLD-MCNC: 13.7 G/DL — SIGNIFICANT CHANGE UP (ref 11.5–15.5)
IMM GRANULOCYTES # BLD AUTO: 0.02 K/UL — SIGNIFICANT CHANGE UP (ref 0–0.07)
IMM GRANULOCYTES NFR BLD AUTO: 0.4 % — SIGNIFICANT CHANGE UP (ref 0–0.9)
LYMPHOCYTES # BLD AUTO: 1.72 K/UL — SIGNIFICANT CHANGE UP (ref 1–3.3)
LYMPHOCYTES NFR BLD AUTO: 35.2 % — SIGNIFICANT CHANGE UP (ref 13–44)
MCHC RBC-ENTMCNC: 28.7 PG — SIGNIFICANT CHANGE UP (ref 27–34)
MCHC RBC-ENTMCNC: 32.7 G/DL — SIGNIFICANT CHANGE UP (ref 32–36)
MCV RBC AUTO: 87.8 FL — SIGNIFICANT CHANGE UP (ref 80–100)
MONOCYTES # BLD AUTO: 0.35 K/UL — SIGNIFICANT CHANGE UP (ref 0–0.9)
MONOCYTES NFR BLD AUTO: 7.2 % — SIGNIFICANT CHANGE UP (ref 2–14)
NEUTROPHILS # BLD AUTO: 2.69 K/UL — SIGNIFICANT CHANGE UP (ref 1.8–7.4)
NEUTROPHILS NFR BLD AUTO: 55 % — SIGNIFICANT CHANGE UP (ref 43–77)
NRBC # BLD AUTO: 0 K/UL — SIGNIFICANT CHANGE UP (ref 0–0)
NRBC # FLD: 0 K/UL — SIGNIFICANT CHANGE UP (ref 0–0)
NRBC BLD AUTO-RTO: 0 /100 WBCS — SIGNIFICANT CHANGE UP (ref 0–0)
PLATELET # BLD AUTO: 296 K/UL — SIGNIFICANT CHANGE UP (ref 150–400)
PMV BLD: 9.9 FL — SIGNIFICANT CHANGE UP (ref 7–13)
RBC # BLD: 4.77 M/UL — SIGNIFICANT CHANGE UP (ref 3.8–5.2)
RBC # FLD: 13.2 % — SIGNIFICANT CHANGE UP (ref 10.3–14.5)
WBC # BLD: 4.89 K/UL — SIGNIFICANT CHANGE UP (ref 3.8–10.5)
WBC # FLD AUTO: 4.89 K/UL — SIGNIFICANT CHANGE UP (ref 3.8–10.5)

## 2025-06-24 PROCEDURE — 93971 EXTREMITY STUDY: CPT

## 2025-06-24 PROCEDURE — 99214 OFFICE O/P EST MOD 30 MIN: CPT

## 2025-06-24 PROCEDURE — G2211 COMPLEX E/M VISIT ADD ON: CPT | Mod: NC

## 2025-06-24 PROCEDURE — 93971 EXTREMITY STUDY: CPT | Mod: 26,RT

## 2025-06-25 DIAGNOSIS — C50.412 MALIGNANT NEOPLASM OF UPPER-OUTER QUADRANT OF LEFT FEMALE BREAST: ICD-10-CM

## 2025-06-25 LAB
25(OH)D3 SERPL-MCNC: 36.1 NG/ML
ALBUMIN SERPL ELPH-MCNC: 5 G/DL
ALP BLD-CCNC: 148 U/L
ALT SERPL-CCNC: 22 U/L
ANION GAP SERPL CALC-SCNC: 17 MMOL/L
AST SERPL-CCNC: 25 U/L
BILIRUB SERPL-MCNC: 0.4 MG/DL
BUN SERPL-MCNC: 8 MG/DL
CALCIUM SERPL-MCNC: 10.3 MG/DL
CHLORIDE SERPL-SCNC: 104 MMOL/L
CO2 SERPL-SCNC: 21 MMOL/L
CREAT SERPL-MCNC: 0.54 MG/DL
EGFRCR SERPLBLD CKD-EPI 2021: 103 ML/MIN/1.73M2
FERRITIN SERPL-MCNC: 53 NG/ML
FOLATE SERPL-MCNC: 15.9 NG/ML
GLUCOSE SERPL-MCNC: 99 MG/DL
MAGNESIUM SERPL-MCNC: 2.1 MG/DL
PHOSPHATE SERPL-MCNC: 4.8 MG/DL
POTASSIUM SERPL-SCNC: 4.7 MMOL/L
PROT SERPL-MCNC: 7.4 G/DL
SODIUM SERPL-SCNC: 142 MMOL/L
VIT B12 SERPL-MCNC: 443 PG/ML

## 2025-06-27 ENCOUNTER — RESULT REVIEW (OUTPATIENT)
Age: 65
End: 2025-06-27

## 2025-06-27 ENCOUNTER — APPOINTMENT (OUTPATIENT)
Dept: ULTRASOUND IMAGING | Facility: CLINIC | Age: 65
End: 2025-06-27
Payer: COMMERCIAL

## 2025-06-27 PROCEDURE — 76641 ULTRASOUND BREAST COMPLETE: CPT | Mod: RT

## (undated) DEVICE — STAPLER SKIN VISI-STAT 35 WIDE

## (undated) DEVICE — PACK GENERAL MINOR

## (undated) DEVICE — POSITIONER STRAP ARMBOARD VELCRO TS-30 12

## (undated) DEVICE — PACK MINOR

## (undated) DEVICE — SUT MONOCRYL 5-0 18" P-3 UNDYED

## (undated) DEVICE — DRAPE HALF SHEET 40X57"

## (undated) DEVICE — SYR LUER LOK 50CC

## (undated) DEVICE — BRA PINK MED 33-36"

## (undated) DEVICE — DRAPE INSTRUMENT POUCH

## (undated) DEVICE — WRAP COMPRESSION CALF MED

## (undated) DEVICE — POSITIONER FOAM EGG CRATE ULNAR (2PCS)

## (undated) DEVICE — ELCTR EDGE BOVIE INSULATED BLADE TIP 2.75"

## (undated) DEVICE — WARMING BLANKET LOWER ADULT

## (undated) DEVICE — SPONGE LAP X-RAY DETECTABLE 18X18"

## (undated) DEVICE — BLADE SCALPEL SAFETYLOCK #15

## (undated) DEVICE — SUT MONOCRYL 4-0 18" PS-2

## (undated) DEVICE — SUT VICRYL 3-0 18" PS-2 UNDYED

## (undated) DEVICE — SUT PLAIN GUT FAST ABSORBING 5-0 PC-1

## (undated) DEVICE — SUT ETHILON 5-0 18" P-3

## (undated) DEVICE — ELCTR BOVIE TIP BLADE INSULATED 2.75" EDGE

## (undated) DEVICE — DRSG ABDOMINAL BINDER MED/LG 9" X 36"-64"

## (undated) DEVICE — BRA PINK SM 30-33"

## (undated) DEVICE — DRAPE SPLIT SHEETS 77X108"

## (undated) DEVICE — POSITIONER STRAP ARMBOARD VELCRO TS-30

## (undated) DEVICE — DRSG XEROFORM 2 X 2"

## (undated) DEVICE — SUT MONOCRYL 4-0 27" PS-2 UNDYED

## (undated) DEVICE — DRAPE 1/2 SHEET 40X57"

## (undated) DEVICE — DRAPE 3/4 SHEET 52X76"

## (undated) DEVICE — GOWN LG

## (undated) DEVICE — DRAPE IOBAN 23X23"

## (undated) DEVICE — BLANKET WARMER LOWER ADULT

## (undated) DEVICE — PROTECTOR HEEL / ELBOW FLUFFY

## (undated) DEVICE — SOLIDIFIER CANN EXPRESS 3K

## (undated) DEVICE — SYR LUER LOK 10CC

## (undated) DEVICE — VENODYNE/SCD SLEEVE CALF LARGE

## (undated) DEVICE — SOL IRR POUR NS 0.9% 500ML

## (undated) DEVICE — SUT MONOCRYL 3-0 18" PS-2 UNDYED

## (undated) DEVICE — TUBING INFILTRATION

## (undated) DEVICE — POSITIONER FOAM HEAD CRADLE (PINK)

## (undated) DEVICE — ELCTR BOVIE PENCIL BLADE 10FT

## (undated) DEVICE — LABEL MED BLANK W PEN

## (undated) DEVICE — SUT DERMABOND 0.7ML

## (undated) DEVICE — SOL IRR POUR H2O 1500ML

## (undated) DEVICE — Device

## (undated) DEVICE — DRAPE SPLIT SHEET 77" X 108"

## (undated) DEVICE — DRSG STERISTRIPS 0.5X4"

## (undated) DEVICE — SOL IRR POUR H2O 500ML

## (undated) DEVICE — TUBING HI-VAC PSI-TEC STERILE

## (undated) DEVICE — SUT NYLON 2-0 18" FS

## (undated) DEVICE — TRAP SPECIMEN  70ML